# Patient Record
Sex: FEMALE | Race: WHITE | NOT HISPANIC OR LATINO | Employment: PART TIME | URBAN - METROPOLITAN AREA
[De-identification: names, ages, dates, MRNs, and addresses within clinical notes are randomized per-mention and may not be internally consistent; named-entity substitution may affect disease eponyms.]

---

## 2021-07-26 PROCEDURE — 88305 TISSUE EXAM BY PATHOLOGIST: CPT | Mod: 26 | Performed by: DERMATOLOGY

## 2021-07-26 PROCEDURE — 88305 TISSUE EXAM BY PATHOLOGIST: CPT | Mod: TC,ORL | Performed by: DERMATOLOGY

## 2021-07-28 ENCOUNTER — LAB REQUISITION (OUTPATIENT)
Dept: LAB | Facility: CLINIC | Age: 71
End: 2021-07-28
Payer: COMMERCIAL

## 2021-07-29 LAB
PATH REPORT.COMMENTS IMP SPEC: NORMAL
PATH REPORT.COMMENTS IMP SPEC: NORMAL
PATH REPORT.FINAL DX SPEC: NORMAL
PATH REPORT.GROSS SPEC: NORMAL
PATH REPORT.MICROSCOPIC SPEC OTHER STN: NORMAL
PATH REPORT.RELEVANT HX SPEC: NORMAL

## 2024-04-23 ENCOUNTER — TRANSFERRED RECORDS (OUTPATIENT)
Dept: HEALTH INFORMATION MANAGEMENT | Facility: CLINIC | Age: 74
End: 2024-04-23
Payer: COMMERCIAL

## 2024-04-26 ENCOUNTER — TRANSCRIBE ORDERS (OUTPATIENT)
Dept: OTHER | Age: 74
End: 2024-04-26

## 2024-04-26 DIAGNOSIS — Z17.0 MALIGNANT NEOPLASM OF UPPER-OUTER QUADRANT OF LEFT BREAST IN FEMALE, ESTROGEN RECEPTOR POSITIVE (H): Primary | ICD-10-CM

## 2024-04-26 DIAGNOSIS — C50.412 MALIGNANT NEOPLASM OF UPPER-OUTER QUADRANT OF LEFT BREAST IN FEMALE, ESTROGEN RECEPTOR POSITIVE (H): Primary | ICD-10-CM

## 2024-04-29 ENCOUNTER — PRE VISIT (OUTPATIENT)
Dept: ONCOLOGY | Facility: CLINIC | Age: 74
End: 2024-04-29
Payer: COMMERCIAL

## 2024-04-29 ENCOUNTER — PATIENT OUTREACH (OUTPATIENT)
Dept: ONCOLOGY | Facility: CLINIC | Age: 74
End: 2024-04-29
Payer: COMMERCIAL

## 2024-04-29 NOTE — TELEPHONE ENCOUNTER
RECORDS STATUS - BREAST    RECORDS REQUESTED FROM: [facility]    Appt Date: TBD NN WQ    Malignant neoplasm of upper-outer quadrant of left breast in female, estrogen receptor positive (H)     Records Requested     April 29, 2024 11:03 AM  [id number]   Facility  [facility]    Outcome I called [facility] Dept [phone] - I left a detailed vm requesting a call back.      Imaging Received  April 29, 2024 3:10 PM    Action: Breast Images from [facility] received and email sent to  Imaging team to resolve breast images to PACS.     Action May 1, 2024 9:01 AM SY   Action Taken - Carlie at [facility] path called stating they just receive the FISH report and ask if we would like a copy. I ask for Carlie to fax it over.     1:01 PM:  - FISH Path report is received, faxed to HIM and emailed to NN.      Action May 1, 2024 12:38 PM    Action Taken Slides from [facility] received and taken to 5th floor path lab for review.     Action May 15, 2024 1:59 PM    Action Taken Slides from [facility] (HER2 slides) received and taken to [facility] floor [facility] lab for review.        NOTES DETAILS STATUS   OFFICE NOTE from referring provider [facility]- [facility] Asmita Randolph MD    [facility]   MEDICATION LIST [facility]- [facility]    LABS     PATHOLOGY REPORTS  (Tissue diagnosis, Stage, ER/MD percentage positive and intensity of staining, HER2 IHC, FISH, and all biopsies from breast and any distant metastasis)                 Report in [facility]- [facility]  (Slides Requested)  [facility] [employer] Tracking #: [id number] 4/23/24: [id number] (positive)   IMAGING (NEED IMAGES & REPORT)     MAMMO Requested- [facility]  4/23/2024, 4/15/2024, 3/21/2023, 3/21/2022, 3/5/2021, 2/24/2020, 2/4/2019    ULTRASOUND Requested- [facility] 4/23/2024    Xray Chest Requested- [facility]

## 2024-04-29 NOTE — PROGRESS NOTES
New Patient Oncology Nurse Navigator Note     Referring provider: Asmita Randolph MD     Referring Clinic/Organization: [facility]      Referred to (specialty:) Medical Oncology and Cancer Surgery      Date Referral Received: April 29, 2024     Evaluation for:  C50.412, Z17.0 (ICD-10-CM) - Malignant neoplasm of upper-outer quadrant of left breast in female, estrogen receptor positive (H)     Clinical History (per Nurse review of records provided):      Patient had bilateral screening mammograms on 4/15/24 and a focal asymmetry was identified in the outer left breast at approximately 3:00, 12 to 14 cm from the nipple. Left breast ultrasound followed on 4/23 and the mammographic abnormality corresponds with irregular, hyperechoic, shadowing mass at the 2:00 position 11 cm from the nipple. The mass measures 17 x 16 x 15 mm.     4/23/24 - [id number]  Left breast mass, 17 x 16 x 15 mm mass, 2 o  clock, 11 cm from the nipple, coil clip, ultrasound-guided core biopsy:  --- POSITIVE FOR MALIGNANCY: Invasive ductal carcinoma, characterized by:  --------- A. [place] stgstrstastdstest:st st1st of 3, Jose Alfredo score: 7 of 9.  --------- B. Greatest linear length of invasive carcinoma: 7 mm.  --- Associated ductal carcinoma in situ: Absent.  ------ Ancillary studies:  --------- A. Estrogen receptor status: Positive (Strong nuclear staining in >95% of tumor cells).  --------- B. Progesterone receptor status: Positive (Weak nuclear staining in 30% of tumor cells).  --------- C. HER2 status: Pending, results as an addendum.  --------- D. Ki-67 proliferation index: 22.4% (112 of 500).  --- Her2 by FISH:  NEGATIVE (Unamplified).  ----- Her2:D17Z1 ratio = 1.23.  ----- Average Her2 signals per cell = 3.3.    4/29 3390 - Telephoned and spoke with Roxana to assist in scheduling medical oncology and cancer surgery consults. She is anxious to proceed and would like in-person consults. She has a daughter who lives in [facility] [place] [facility]  [place], and the travel and accommodations are not an issue at this point. We discussed consults this week and she is willing to see Dr. Fairbanks, a [facility] provider, at  if necessary.     4/29 1451 - HER2 by FISH has not reported. We will proceed with cancer surgery consult on 5/1 with Dr. Fairbanks at , and medical oncology with Dr. Barfield on 5/6 at .    Patient resides in [place], [place].    Records Location: Care Everywhere, Media, and See Bookmarked material     Records Needed:   Breast imaging for past 5 years (nothing on PACS as of 4/29)   Pathology review for 4/23/24 - [id number]  HER2 by FISH report for 4/23/24 - [id number] (when available)

## 2024-04-30 ENCOUNTER — TRANSCRIBE ORDERS (OUTPATIENT)
Dept: ONCOLOGY | Facility: CLINIC | Age: 74
End: 2024-04-30
Payer: COMMERCIAL

## 2024-04-30 ENCOUNTER — TELEPHONE (OUTPATIENT)
Dept: SURGERY | Facility: CLINIC | Age: 74
End: 2024-04-30
Payer: COMMERCIAL

## 2024-04-30 DIAGNOSIS — C50.412 MALIGNANT NEOPLASM OF UPPER-OUTER QUADRANT OF LEFT BREAST IN FEMALE, ESTROGEN RECEPTOR POSITIVE (H): Primary | ICD-10-CM

## 2024-04-30 DIAGNOSIS — Z17.0 MALIGNANT NEOPLASM OF UPPER-OUTER QUADRANT OF LEFT BREAST IN FEMALE, ESTROGEN RECEPTOR POSITIVE (H): Primary | ICD-10-CM

## 2024-04-30 NOTE — TELEPHONE ENCOUNTER
Patient confirmed via My Chart. My Chart message sent with time and location.    PREVISIT INFORMATION                                                    Roxana Card scheduled for future visit at [facility] specialty clinics.    Patient is scheduled to see Dr. Fairbanks on 5/1/24  Reason for visit: Breast Cancer  Referring provider Radiology  Has patient seen previous specialist? No  Medical Records:  Available in chart.  Patient was previously seen at a [facility] or [facility] facility.    REVIEW                                                      New patient packet mailed to patient: No  Medication reconciliation complete: No      No current outpatient medications on file.       Allergies: Patient has no allergy information on record.        PLAN/FOLLOW-UP NEEDED                                                      Previsit review complete.  Patient will see provider at future scheduled appointment.     Patient Reminders Given:  Please, make sure you bring an updated list of your medications.   If you are having a procedure, please, present 15 minutes early.  If you need to cancel or reschedule,please call [phone].    Olivia Marcelino LPN

## 2024-05-01 ENCOUNTER — OFFICE VISIT (OUTPATIENT)
Dept: SURGERY | Facility: CLINIC | Age: 74
End: 2024-05-01
Attending: FAMILY MEDICINE
Payer: COMMERCIAL

## 2024-05-01 VITALS — HEIGHT: 61 IN | BODY MASS INDEX: 32.81 KG/M2 | WEIGHT: 173.8 LBS

## 2024-05-01 DIAGNOSIS — C50.412 MALIGNANT NEOPLASM OF UPPER-OUTER QUADRANT OF LEFT BREAST IN FEMALE, ESTROGEN RECEPTOR POSITIVE (H): Primary | ICD-10-CM

## 2024-05-01 DIAGNOSIS — Z17.0 MALIGNANT NEOPLASM OF UPPER-OUTER QUADRANT OF LEFT BREAST IN FEMALE, ESTROGEN RECEPTOR POSITIVE (H): Primary | ICD-10-CM

## 2024-05-01 PROCEDURE — 99205 OFFICE O/P NEW HI 60 MIN: CPT | Performed by: SURGERY

## 2024-05-01 RX ORDER — CLINDAMYCIN HCL 150 MG
CAPSULE ORAL
COMMUNITY
Start: 2023-12-12

## 2024-05-01 RX ORDER — DILTIAZEM HYDROCHLORIDE 60 MG/1
60 CAPSULE, EXTENDED RELEASE ORAL 2 TIMES DAILY
COMMUNITY
Start: 2024-04-15 | End: 2025-04-15

## 2024-05-01 RX ORDER — CALCIUM POLYCARBOPHIL 625 MG
1250 TABLET ORAL DAILY
COMMUNITY

## 2024-05-01 RX ORDER — ALBUTEROL SULFATE 90 UG/1
2 AEROSOL, METERED RESPIRATORY (INHALATION)
COMMUNITY
Start: 2023-03-20 | End: 2024-09-09

## 2024-05-01 RX ORDER — CETIRIZINE HYDROCHLORIDE 10 MG/1
10 TABLET ORAL DAILY
COMMUNITY
Start: 2023-06-01

## 2024-05-01 RX ORDER — ACETAMINOPHEN 325 MG/1
975 TABLET ORAL ONCE
Status: CANCELLED | OUTPATIENT
Start: 2024-05-01 | End: 2024-05-01

## 2024-05-01 RX ORDER — PANTOPRAZOLE SODIUM 40 MG/1
TABLET, DELAYED RELEASE ORAL
COMMUNITY
Start: 2022-10-07

## 2024-05-01 RX ORDER — AZELASTINE 1 MG/ML
2 SPRAY, METERED NASAL
COMMUNITY
End: 2024-09-09

## 2024-05-01 RX ORDER — NITROGLYCERIN 0.4 MG/1
0.4 TABLET SUBLINGUAL
COMMUNITY
Start: 2023-03-20

## 2024-05-01 RX ORDER — TRIAMCINOLONE ACETONIDE 1 MG/G
CREAM TOPICAL
COMMUNITY

## 2024-05-01 RX ORDER — ASPIRIN 81 MG
TABLET,CHEWABLE ORAL
COMMUNITY
Start: 2023-10-13

## 2024-05-01 NOTE — NURSING NOTE
Breast Nurse Care Coordination:       I introduced self to patient and  and explained my role of breast nurse coordinator. I visited with Roxana at her surgical consultation on 5/1/24  with Dr. Fairbanks at the [facility]         Roxana was given the new breast cancer patient packet which includes educational material and support resources such as American [employer], Fighting Cancer through Diet and Lifestyle, [employer], [employer], and [employer]. I also enclosed a copy of her right breast pathology report dated 4/25/24      At the end of the consultation, we reviewed Roxana's plan of care and education. The plan is for patient to be scheduled for a contrast Mammogram. This is scheduled on 5/6/24 at the [facility] at 11[place]. Roxana is leaning towards a Lumpectomy. Pt informed per Dr. Fairbanks surgery orders will be placed today and pt will be contacted to schedule surgery as soon as possible.      I informed her for surgery she will need a pre-op exam within 30 days before surgery. Patient notified that surgery pathology results will be reviewed in clinic at Roxana's first post operative visit with Dr. Fairbanks about two weeks after surgery. Surgery packet reviewed with patient and surgery soap provided      I gave patient [facility] educational materials regarding Lumpectomy, Mastectomy  and [place] Lymph Node biopsy.  Informed patient for Breast surgery, she will want to have a comfortable supportive bra that opens in the front to wear 24/7- 2 weeks following her surgery. I gave patient information on different options to purchase post surgery bras and garments     I answered her questions and encouraged patient to call me back with any future questions or concerns.  Roxana has my contact information, and knows to contact me in the future with any questions or concerns.     Ivy Ibanez CC  Surgical Oncology, Plastics and General Surgery  [facility]  Clinic

## 2024-05-01 NOTE — NURSING NOTE
"Roxana Card's goals for this visit include:   Chief Complaint   Patient presents with    Consult     Breast cancer       She requests these members of her care team be copied on today's visit information: no    PCP: Asmita Randolph    Referring Provider:  Asmita Rogers MD  [address] [place] [phone]    Ht 1.537 m (5' 0.5\")   Wt 78.8 kg (173 lb 12.8 oz)   BMI 33.38 kg/m      Do you need any medication refills at today's visit? No    Olivia Marcelino LPN      "

## 2024-05-01 NOTE — PROGRESS NOTES
NEW SURGICAL CONSULTATION  May 1, 2024    Roxana Card is a 73 year old woman who presents with a left  breast complaint.  She was referred by Asmita Rogers MD.    HPI:    She notes no masses in either breast, axilla, or neck. She denies any nipple discharge or nipple inversion.     Imaging showed a 1.7 cm mass at 2:00 11 cm FN in the LEFT breast.    A biopsy was performed and a coil clip was placed.  It showed invasive ductal carcinoma, grade 2, ER+ OH+ HER2-.      BREAST-SPECIFIC HISTORY:  Prior breast surgeries: Yes left lumpectomy 20 years ago  Prior radiation history: No  Bra size: 38 G  Dominant hand: Right    FAMILY HISTORY:  Breast ca: No  Ovarian ca: No  Pancreatic ca: No  Melanoma: No  Gastric ca: No  Colon ca: Yes - father (dx 71), pat uncle and aunt  Other cancer: Yes pat uncle w/ lung ca (smoker)    Patient Active Problem List   Diagnosis    Malignant neoplasm of upper-outer quadrant of left breast in female, estrogen receptor positive (H)    PCI in 2017 - ASA only now  Able to climb a flight of stairs  No CVA, DM    No past medical history on file.    No past surgical history on file.  Bilateral knee replacement    Current Outpatient Medications   Medication Sig Dispense Refill    albuterol (PROAIR HFA/PROVENTIL HFA/VENTOLIN HFA) 108 (90 Base) MCG/ACT inhaler Inhale 2 puffs into the lungs      ASPIRIN LOW DOSE 81 MG chewable tablet CHEW AND SWALLOW 1 TAB IN THE MORNING AND 1 TAB IN THE EVENING FOR BLOOD CLOT PREVENTION.      azelastine (ASTELIN) 0.1 % nasal spray Spray 2 sprays in nostril      Calcium Polycarbophil (FIBER) 625 MG tablet Take 1,250 mg by mouth      cetirizine (ZYRTEC) 10 MG tablet Take 10 mg by mouth      clindamycin (CLEOCIN) 150 MG capsule Take 4 Capsules 1 hour prior to dental appointment.      diltiazem ER (CARDIZEM SR) 60 MG 12 hr capsule Take 60 mg by mouth      Multiple Vitamin (MULTI-DAY VITAMINS PO) Take 1 tablet by mouth daily      nitroGLYcerin (NITROSTAT) 0.4  "MG sublingual tablet Place 0.4 mg under the tongue      pantoprazole (PROTONIX) 40 MG EC tablet TAKE ONE TABLET BY MOUTH EVERY MORNING 30 MINUTES BEFORE BREAKFAST      triamcinolone (KENALOG) 0.1 % external cream Apply to the affected area(s) if needed in the morning, at noon, and before bedtime. 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 Has a daughter who lives in [facility].    ROS:  Back pain: No  Headache: No  Abdominal pain: No  Unexpected weight loss: No  Easy bruising/bleeding: No  History of DVT/PE: No    Ht 1.537 m (5' 0.5\")   Wt 78.8 kg (173 lb 12.8 oz)   BMI 33.38 kg/m     Physical Exam  Constitutional:       Appearance: She is well-developed.   Chest:   Breasts:     Breasts are symmetrical.      Right: No inverted nipple, mass, nipple discharge, skin change or tenderness.      Left: Mass present. No inverted nipple, nipple discharge, skin change or tenderness.          Comments: Patient was examined in both supine and upright positions.   Lymphadenopathy:      Cervical: No cervical adenopathy.      Right cervical: No superficial, deep or posterior cervical adenopathy.     Left cervical: No superficial, deep or posterior cervical adenopathy.      Upper Body:      Right upper body: No supraclavicular, axillary or pectoral adenopathy.      Left upper body: No supraclavicular, axillary or " pectoral adenopathy.      Comments: No lymphedema in bilateral upper extremities.   Skin:     General: Skin is warm and dry.          INVESTIGATIONS:    Ultrasound at [facility] (4/23/2024) showed:  FINDINGS: Ultrasound evaluation of the left breast was performed. The mammographic abnormality corresponds with irregular, hyperechoic,   shadowing mass at the 2:00 position 11 cm from the nipple. The mass measures 17 x 16 x 15 mm. No additional suspicious sonographic   findings.   IMPRESSION: Highly suspicious left breast mass.     Screening Mammogram at [facility] (4/15/2024) showed:  FINDINGS: Tomosynthesis craniocaudal and mediolateral oblique views were obtained. There is a focal asymmetry in the outer left breast at   approximately 3:00, 12 to 14 cm from the nipple. Recommend further evaluation with ultrasound and possible diagnostic mammogram.   The other breast is unremarkable.   IMPRESSION: Incomplete imaging assessment.     Biopsy (4/23/2024) showed:  Final Diagnosis    Left breast mass, 17 x 16 x 15 mm mass, 2 o  clock, 11 cm from the nipple, coil clip, ultrasound-guided core biopsy:  --- POSITIVE FOR MALIGNANCY:  Invasive ductal carcinoma, characterized by:  --------- A. [place] stgstrstastdstest:st st1st of 3, Jose Alfredo score: 7 of 9.  --------- B. Greatest linear length of invasive carcinoma: 7 mm.  --- Associated ductal carcinoma in situ: Absent.     ------ Ancillary studies:  --------- A. Estrogen receptor status: Positive (Strong nuclear staining in >95% of tumor cells).  --------- B. Progesterone receptor status: Positive (Weak nuclear staining in 30% of tumor cells).  --------- C. Her2 status: Pending, results as an addendum.  --------- D. Ki-67 proliferation index: 22.4% (112 of 500).     ASSESSMENT:  Roxana Card is a 73 year old woman with a LEFT breast cancer.    Her stage is:   Cancer Staging   Malignant neoplasm of upper-outer quadrant of left breast in female, estrogen receptor positive (H)  Staging form:  "Breast, AJCC 8th Edition  - Clinical: Stage IA (cT1c, cN0, cM0, G2, ER+, MN+, HER2-) - Signed by America Fairbanks MD on 5/1/2024    I personally reviewed the imaging above. I do recommend a BILATERAL contrast mammogram to complete her breast evaluation.    I reviewed the imaging, diagnosis, staging, and management (including surgery, chemotherapy/systemic therapy, radiation therapy, and endocrine therapy) of breast cancer with Roxana Card and her . A copy of the biopsy pathology report was also provided.    The mainstay of treatment for resectable breast cancer is surgical resection, in the form of either breast conservation (segmental mastectomy plus radiation) or mastectomy.  We reviewed that the two strategies are equivalent in terms of overall survival.  The advantages and disadvantages of each were discussed.    The surgeries are performed as day surgeries.  Roxana Card IS a candidate for breast conservation therapy.  We discussed that this involves two necessary components: the lumpectomy (or \"segmental mastectomy\"), and several weeks of whole breast radiation therapy.  We discussed that the overall survival after breast conservation therapy is identical to mastectomy and that local recurrence rates are significantly higher if segmental mastectomy was performed without subsequent radiation. However, newer data suggest that in women >70, radiation may be omitted or delivered in a shorter course in breast conservation therapy in the setting of small, favourable primaries in the absence of lymphovascular invasion, janie metastases, or concerning surgical margins.  We reviewed that this determination will be made based on post surgical pathology.   We also discussed the significance of clear margins and that a subsequent procedure may be necessary to achieve this.    Because the lesion is not palpable, a radiofrequency identification (RFID) seed-localized approach would be taken for breast " conservation.  She would present prior to surgery for an image-guided RFID seed placement.  The risks of a RFID seed-localized segmental mastectomy were discussed with the patient and family, including the risks of bleeding, wound infection, wound dehiscence, and post-operative contour change to the breast.  We discussed obtaining a supportive bra for postoperative recovery and that prescriptions for narcotics are not typically provided for this procedure.    There is no advantage to a mastectomy in this situation.    In addition to the surgical management of the breast, a sentinel lymph node biopsy is recommended for janie staging of the axilla.  This is performed with the combination of the radioactive Tilmanocept and dye (lymphazurin or indocyanine green). The risks of a sentinel lymph node biopsy were discussed with the patient and family, including the risks of lymphedema (5%), bleeding, wound infection, wound dehiscence, seroma formation, and paresthesias. There is an approximately 10% false negative rate associated with sentinel lymph node biopsy as published in the literature.  The findings of the sentinel lymph node biopsy may result in the need for further treatment.     We discussed that surgical pathology results will be reviewed at the postoperative visit to allow for careful discussion of next steps and for answering questions.    Finally, we reviewed that as part of team-based approach to breast cancer, medical oncology and radiation oncology referrals may also be made to discuss systemic/endocrine therapy and radiation therapy. She has an upcoming appointment with Dr Barfield of Medical oncology as well.    All of the above was discussed with Roxana Card and all questions were answered.  She elected to proceed with contrast mammogram and then LEFT RFID seed-localized segmental mastectomy and axillary sentinel lymph node mapping and biopsy.    Total time spent with the patient was 50 minutes, of  which 75% was counseling.     PLAN:  BILATERAL contrast mammogram  Consultation with Dr Barfield of Medical Oncology on 5/6  Proceed with breast conservation first if no other abnormalities are seen on contrast mammogram  LEFT RFID seed-localized segmental mastectomy   LEFT axillary sentinel lymph node mapping and biopsy   Patient to report to her PCP for preoperative H&P and testing.     America Fairbanks MD MS [facility] FACS  Associate Professor of Surgery  Division of Surgical Oncology  [facility]     60 minutes spent on the date of the encounter doing chart review, review of outside records, review of test result(s), interpretation of test(s), patient visit, documentation, care coordination, and discussion with family.

## 2024-05-02 ENCOUNTER — LAB REQUISITION (OUTPATIENT)
Dept: LAB | Facility: CLINIC | Age: 74
End: 2024-05-02
Payer: COMMERCIAL

## 2024-05-02 ENCOUNTER — TELEPHONE (OUTPATIENT)
Dept: ONCOLOGY | Facility: CLINIC | Age: 74
End: 2024-05-02
Payer: COMMERCIAL

## 2024-05-02 PROCEDURE — 88321 CONSLTJ&REPRT SLD PREP ELSWR: CPT | Mod: GC | Performed by: STUDENT IN AN ORGANIZED HEALTH CARE EDUCATION/TRAINING PROGRAM

## 2024-05-02 NOTE — TELEPHONE ENCOUNTER
Called patient to discuss surgery scheduling with Dr. Fairbanks. Spoke with patient and scheduled surgery with Dr. Fairbanks. Surgery is scheduled at [facility] for 5/15.    H&P with PCP Dr. Miriam Rogers within 30 days of the surgery date. Patient verbalized understanding H&P is needed prior to surgery and instructed to call PCP office to schedule. Patient is aware they will get a call from the pre-op nurses prior to surgery to confirm their arrival time.      Breast Center to inject NM the morning of surgery. Patient also will need tag placement prior to surgery. Message provided to breast RN in .    Post-op scheduled for 5/29 with Dr. Fairbanks in [facility].    Surgery packet provided in clinic during clinic visit on 5/1.    The patient has no further questions regarding surgery at this time.     Lorri Lujan on 5/2/2024 at 10:51 AM

## 2024-05-03 NOTE — PROGRESS NOTES
Medical Oncology Note      Roxana Card    Age: 73 year old        CC: Breast Cancer      HPI: Roxana Card is a 73 year old postmenopausal woman with recent diagnosis of screen detected cT1cN0 HR+/HER2 negative IDC of the left breast. She presents today for med onc initial consultation.         Her full oncologic history is as follows:   Oncologic History  Patient Active Problem List    Diagnosis Date Noted    Malignant neoplasm of upper-outer quadrant of left breast in female, estrogen receptor positive (H) 05/01/2024     Priority: High     Left breast cancer  3/20/2023 last normal screening mammo    4/15/2024 screening mammo showing focal asymmetry in the outer left breast at 3:00, 12-14 cm FN    4/23/2024 left breast ultrasound showing a 1.7 x 1.6 x 1.5 cm mass at 2:00, 11 cm FN, corresponding to the mammographic abnormality.  US guided biopsy demonstrates a grade 2 IDC.  ER (3+,> 95%), GA (1+, 30%), HER2 2+ negative by FISH, Ki-67 22%    5/6/2024 Contrast enhanced mammo  left breast demonstrates 1.8 cm of enhancement. No other enhancements     5/6/2024 US of left axillary LN normal       Coronary artery disease involving native coronary artery of native heart without angina pectoris 05/06/2024     Priority: Medium    GERD (gastroesophageal reflux disease) 05/06/2024     Priority: Medium    Mixed hyperlipidemia 05/06/2024     Priority: Medium    Primary hypertension 05/06/2024     Priority: Medium    Vitamin D deficiency 05/06/2024     Priority: Medium    Recurrent UTI 09/20/2023     Priority: Medium    Stage 3a chronic kidney disease (H) 03/17/2022     Priority: Medium    Other microscopic colitis 11/23/2021     Priority: Medium    Bilateral primary osteoarthritis of knee 04/28/2021     Priority: Medium    Age-related osteoporosis without current pathological fracture 04/20/2021     Priority: Medium    Mild intermittent asthma 07/09/2019     Priority: Medium          Interval History  Roxana roth  accompanied by her supportive   for initial consultation.  She met with Dr. Fairbanks last week and is planned to undergo partial mastectomy and SLNB on 5/15/2024.    She denies any symptoms related to her left breast cancer including a palpable mass, pain, nipple discharge, or erythema of the skin.    She has otherwise been feeling well without any headaches, visual changes, cough, SOB, chest pain, n/v, constipation/diarrhea, abdominal pain, focal weakness or numbness.     They recently returned from a vacation in [place].      Past Medical History  Past Medical History:   Diagnosis Date    Age-related osteoporosis without current pathological fracture 2021    Breast cancer (H)     CAD (coronary artery disease)     GERD (gastroesophageal reflux disease) 2024    Microscopic colitis     resolved    Mild intermittent asthma 2019    Osteoarthritis          Past Surgical History  Past Surgical History:   Procedure Laterality Date    CV PCI  2016    HYSTERECTOMY, MEHRDAD      for prolapse    knee replacement Bilateral 10/23/2023         Family History  Family History   Problem Relation Age of Onset    Colon Cancer Father     Colon Cancer Paternal Aunt     Colon Cancer Paternal Uncle     Lung Cancer Paternal Uncle     Colon Cancer Cousin     Colon Cancer Other         8 out of 15 paternal aunt/uncles have colon cancer          Social History  Social History     Tobacco Use    Smoking status: Never    Smokeless tobacco: Never      Social History     Social History Narrative    Menarche 12    Menopause 55. HRT for 5 years      first at age 20.  both children     Youngest child at diagnosis    Retired from, but working part time in  home          Current Medications:  Current Outpatient Medications   Medication Sig Dispense Refill    acetaminophen (TYLENOL) 650 MG CR tablet Take 1,300 mg by mouth      albuterol (PROAIR HFA/PROVENTIL HFA/VENTOLIN HFA) 108 (90 Base) MCG/ACT inhaler  "Inhale 2 puffs into the lungs      ASPIRIN LOW DOSE 81 MG chewable tablet CHEW AND SWALLOW 1 TAB IN THE MORNING AND 1 TAB IN THE EVENING FOR BLOOD CLOT PREVENTION.      azelastine (ASTELIN) 0.1 % nasal spray Spray 2 sprays in nostril      Calcium Polycarbophil (FIBER) 625 MG tablet Take 1,250 mg by mouth      cetirizine (ZYRTEC) 10 MG tablet Take 10 mg by mouth      clindamycin (CLEOCIN) 150 MG capsule Take 4 Capsules 1 hour prior to dental appointment.      diltiazem ER (CARDIZEM SR) 60 MG 12 hr capsule Take 60 mg by mouth      Multiple Vitamin (MULTI-DAY VITAMINS PO) Take 1 tablet by mouth daily      pantoprazole (PROTONIX) 40 MG EC tablet TAKE ONE TABLET BY MOUTH EVERY MORNING 30 MINUTES BEFORE BREAKFAST      REPATHA SURECLICK 140 MG/ML prefilled autoinjector Inject 140 mg Subcutaneous      triamcinolone (KENALOG) 0.1 % external cream Apply to the affected area(s) if needed in the morning, at noon, and before bedtime. External cream; APPLY A SMALL AMOUNT 3 TIMES DAILY AS DIRECTED      vitamin D3 (CHOLECALCIFEROL) 125 MCG (5000 UT) tablet Take 5,000 Units by mouth      nitroGLYcerin (NITROSTAT) 0.4 MG sublingual tablet Place 0.4 mg under the tongue (Patient not taking: Reported on 5/6/2024)           ECOG Performance Status: 0    Physical Examination:  /81 (BP Location: Right arm, Patient Position: Sitting, Cuff Size: Adult Large)   Pulse 87   Temp 97.7  F (36.5  C) (Oral)   Resp 16   Ht 1.522 m (4' 11.92\")   Wt 77.1 kg (170 lb)   SpO2 98%   BMI 33.29 kg/m    General:  Well appearing, well-nourished adult female in NAD.  HEENT:  Normocephalic.  Sclera anicteric.  MMM.  No lesions of the oropharynx.  Breast: Left breast 2-3:00 1cm density w/ indistict borders   Lymph:  No cervical, supraclavicular, or axillary LAD.  Chest:  CTA bilaterally.  No wheezes or crackles.  CV:  RRR.  No murmurs or gallops  Abd:  Soft/NT/ND.  BS normoactive.  No hepatosplenomegaly.  Ext:  No pitting edema of the bilateral " "lower extremities.  Pulses 2+ and symmetric.  Musculo:  Strength 5/5 throughout.  Neuro:  Cranial nerves grossly intact.  Psych:  Mood and affect appear normal.    Laboratory Data:  No results found for: \"WBC\", \"HGB\", \"HCT\", \"MCV\", \"PLT\"  No results found for: \"NA\", \"HCO3\", \"CREATININE\", \"BUN\", \"ANIONGAP\", \"GLUCOSE\"  No results found for: \"ALT\", \"AST\", \"GGT\", \"ALKPHOS\", \"BILITOT\"  No results found for: \"INR\", \"PT\"      Radiology data:  I have personally reviewed the images of / or the following radiology data: As detailed in the oncology timeline      Pathology and other data:  I have personally reviewed the following data: As detailed in the oncology timeline      Assessment and Recommendations  Roxana Card  is a 73 year old postmenopausal  woman with a new diagnosis of screen detected cT1cN0 HR+/HER2 negative IDC of the left breast.    I had a long discussion with the patient in which we reviewed her breast cancer diagnosis and workup history to date. I reviewed all of the relevant and available clinic notes, imaging, and pathology reports. We discussed the use of locoregional therapy to reduce risk of locoregional disease and the use of systemic therapies to primarily reduce risk of systemic recurrence. Informed her that locoregional breast cancer is curable and that the goal would be to reduced her risk of developing systemic disease, which can potentially be incurable. Based on the currently available information thus far, she has localized disease.    Because she has HR+ disease, she will need at least 5 years of hormone therapy. Since she is postmenopausal, I recommend aromatase inhibitors (AI). We did not discuss adverse effects today, but will do so at her follow-up appointment with me.  With regards to systemic chemotherapy, discussed that it is unlikely that she will need adjuvant chemotherapy.  However in light of slightly elevated proliferation rate (Ki-67) as seen on her diagnostic biopsy and " lower PA positivity, there is a chance she could have a luminal B disease, which can be molecularly high risk.  In that case, discussed that we can have further discussions about the potential benefit of adjuvant systemic chemotherapy.  Lastly, we briefly discussed adjuvant targeted therapy such as CDK 4/6 inhibitors.  Based on currently available information, I do not think that she will need this -although we will finalize decision after her final surgical pathology.    Finally, in light of her breast cancer diagnosis and extensive family history of colon cancer, I recommend that she undergo genetic testing.  This may at times guide our treatment decisions and be helpful information for her family members to have.  She is agreeable to undergoing this additional testing.      RECOMMENDATIONS  - Proceed with surgery first -will need referral to radiation therapy after surgery  - Will request Oncotype Dx  - She will need adjuvant endocrine therapy  - Germline genetic testing and referral to genetic counseling        75 minutes spent on the date of the encounter doing chart review, review of test results, interpretation of tests, patient visit, and documentation       Dame Carolyne MD   of Medicine  Division of Hematology, Oncology and Transplantation  [facility]

## 2024-05-06 ENCOUNTER — ONCOLOGY VISIT (OUTPATIENT)
Dept: ONCOLOGY | Facility: CLINIC | Age: 74
End: 2024-05-06
Attending: INTERNAL MEDICINE
Payer: COMMERCIAL

## 2024-05-06 ENCOUNTER — ANCILLARY PROCEDURE (OUTPATIENT)
Dept: MAMMOGRAPHY | Facility: CLINIC | Age: 74
End: 2024-05-06
Attending: SURGERY
Payer: COMMERCIAL

## 2024-05-06 ENCOUNTER — PATIENT OUTREACH (OUTPATIENT)
Dept: ONCOLOGY | Facility: CLINIC | Age: 74
End: 2024-05-06

## 2024-05-06 VITALS
HEIGHT: 60 IN | BODY MASS INDEX: 33.38 KG/M2 | SYSTOLIC BLOOD PRESSURE: 123 MMHG | WEIGHT: 170 LBS | RESPIRATION RATE: 16 BRPM | HEART RATE: 87 BPM | TEMPERATURE: 97.7 F | OXYGEN SATURATION: 98 % | DIASTOLIC BLOOD PRESSURE: 81 MMHG

## 2024-05-06 DIAGNOSIS — C77.3 SECONDARY AND UNSPECIFIED MALIGNANT NEOPLASM OF AXILLA AND UPPER LIMB LYMPH NODES (H): ICD-10-CM

## 2024-05-06 DIAGNOSIS — Z17.0 MALIGNANT NEOPLASM OF UPPER-OUTER QUADRANT OF LEFT BREAST IN FEMALE, ESTROGEN RECEPTOR POSITIVE (H): ICD-10-CM

## 2024-05-06 DIAGNOSIS — C50.412 MALIGNANT NEOPLASM OF UPPER-OUTER QUADRANT OF LEFT FEMALE BREAST (H): ICD-10-CM

## 2024-05-06 DIAGNOSIS — C50.412 MALIGNANT NEOPLASM OF UPPER-OUTER QUADRANT OF LEFT BREAST IN FEMALE, ESTROGEN RECEPTOR POSITIVE (H): ICD-10-CM

## 2024-05-06 PROBLEM — I25.10 CORONARY ARTERY DISEASE INVOLVING NATIVE CORONARY ARTERY OF NATIVE HEART WITHOUT ANGINA PECTORIS: Status: ACTIVE | Noted: 2024-05-06

## 2024-05-06 PROBLEM — E78.2 MIXED HYPERLIPIDEMIA: Status: ACTIVE | Noted: 2024-05-06

## 2024-05-06 PROBLEM — E55.9 VITAMIN D DEFICIENCY: Status: ACTIVE | Noted: 2024-05-06

## 2024-05-06 PROBLEM — N39.0 RECURRENT UTI: Status: ACTIVE | Noted: 2023-09-20

## 2024-05-06 PROBLEM — J45.20 MILD INTERMITTENT ASTHMA: Status: ACTIVE | Noted: 2019-07-09

## 2024-05-06 PROBLEM — K52.838 OTHER MICROSCOPIC COLITIS: Status: ACTIVE | Noted: 2021-11-23

## 2024-05-06 PROBLEM — M81.0 AGE-RELATED OSTEOPOROSIS WITHOUT CURRENT PATHOLOGICAL FRACTURE: Status: ACTIVE | Noted: 2021-04-20

## 2024-05-06 PROBLEM — I10 PRIMARY HYPERTENSION: Status: ACTIVE | Noted: 2024-05-06

## 2024-05-06 PROBLEM — M17.0 BILATERAL PRIMARY OSTEOARTHRITIS OF KNEE: Status: ACTIVE | Noted: 2021-04-28

## 2024-05-06 PROBLEM — K21.9 GERD (GASTROESOPHAGEAL REFLUX DISEASE): Status: ACTIVE | Noted: 2024-05-06

## 2024-05-06 PROBLEM — N18.31 STAGE 3A CHRONIC KIDNEY DISEASE (H): Status: ACTIVE | Noted: 2022-03-17

## 2024-05-06 PROCEDURE — 77066 DX MAMMO INCL CAD BI: CPT | Performed by: STUDENT IN AN ORGANIZED HEALTH CARE EDUCATION/TRAINING PROGRAM

## 2024-05-06 PROCEDURE — G0463 HOSPITAL OUTPT CLINIC VISIT: HCPCS | Performed by: INTERNAL MEDICINE

## 2024-05-06 PROCEDURE — 76882 US LMTD JT/FCL EVL NVASC XTR: CPT | Mod: LT | Performed by: STUDENT IN AN ORGANIZED HEALTH CARE EDUCATION/TRAINING PROGRAM

## 2024-05-06 PROCEDURE — G0279 TOMOSYNTHESIS, MAMMO: HCPCS | Performed by: STUDENT IN AN ORGANIZED HEALTH CARE EDUCATION/TRAINING PROGRAM

## 2024-05-06 PROCEDURE — 99205 OFFICE O/P NEW HI 60 MIN: CPT | Performed by: INTERNAL MEDICINE

## 2024-05-06 PROCEDURE — 99417 PROLNG OP E/M EACH 15 MIN: CPT | Performed by: INTERNAL MEDICINE

## 2024-05-06 RX ORDER — IOPAMIDOL 755 MG/ML
125 INJECTION, SOLUTION INTRAVASCULAR ONCE
Status: COMPLETED | OUTPATIENT
Start: 2024-05-06 | End: 2024-05-06

## 2024-05-06 RX ORDER — IOPAMIDOL 755 MG/ML
100 INJECTION, SOLUTION INTRAVASCULAR ONCE
Status: COMPLETED | OUTPATIENT
Start: 2024-05-06 | End: 2024-05-06

## 2024-05-06 RX ORDER — EVOLOCUMAB 140 MG/ML
140 INJECTION, SOLUTION SUBCUTANEOUS
COMMUNITY
Start: 2023-11-03

## 2024-05-06 RX ORDER — SENNOSIDES 8.6 MG
1300 CAPSULE ORAL 2 TIMES DAILY
COMMUNITY

## 2024-05-06 RX ADMIN — IOPAMIDOL 95 ML: 755 INJECTION, SOLUTION INTRAVASCULAR at 11:17

## 2024-05-06 ASSESSMENT — PAIN SCALES - GENERAL: PAINLEVEL: NO PAIN (0)

## 2024-05-06 NOTE — LETTER
5/6/2024         RE: Roxana Card  [address] [facility] [address]        Dear Colleague,    Thank you for referring your patient, Roxana Card, to the [facility]. Please see a copy of my visit note below.    Medical Oncology Note      Roxana Card    Age: 73 year old        CC: Breast Cancer      HPI: Roxana Card is a 73 year old postmenopausal woman with recent diagnosis of screen detected cT1cN0 HR+/HER2 negative IDC of the left breast. She presents today for med onc initial consultation.         Her full oncologic history is as follows:   Oncologic History  Patient Active Problem List    Diagnosis Date Noted    Malignant neoplasm of upper-outer quadrant of left breast in female, estrogen receptor positive (H) 05/01/2024     Priority: High     Left breast cancer  3/20/2023 last normal screening mammo    4/15/2024 screening mammo showing focal asymmetry in the outer left breast at 3:00, 12-14 cm FN    4/23/2024 left breast ultrasound showing a 1.7 x 1.6 x 1.5 cm mass at 2:00, 11 cm FN, corresponding to the mammographic abnormality.  US guided biopsy demonstrates a grade 2 IDC.  ER (3+,> 95%), TN (1+, 30%), HER2 2+ negative by FISH, Ki-67 22%    5/6/2024 Contrast enhanced mammo  left breast demonstrates 1.8 cm of enhancement. No other enhancements     5/6/2024 US of left axillary LN normal       Coronary artery disease involving native coronary artery of native heart without angina pectoris 05/06/2024     Priority: Medium    GERD (gastroesophageal reflux disease) 05/06/2024     Priority: Medium    Mixed hyperlipidemia 05/06/2024     Priority: Medium    Primary hypertension 05/06/2024     Priority: Medium    Vitamin D deficiency 05/06/2024     Priority: Medium    Recurrent UTI 09/20/2023     Priority: Medium    Stage 3a chronic kidney disease (H) 03/17/2022     Priority: Medium    Other microscopic colitis 11/23/2021     Priority: Medium    Bilateral primary  osteoarthritis of knee 2021     Priority: Medium    Age-related osteoporosis without current pathological fracture 2021     Priority: Medium    Mild intermittent asthma 2019     Priority: Medium          Interval History  Roxana presents accompanied by her supportive   for initial consultation.  She met with Dr. Fairbanks last week and is planned to undergo partial mastectomy and SLNB on 5/15/2024.    She denies any symptoms related to her left breast cancer including a palpable mass, pain, nipple discharge, or erythema of the skin.    She has otherwise been feeling well without any headaches, visual changes, cough, SOB, chest pain, n/v, constipation/diarrhea, abdominal pain, focal weakness or numbness.     They recently returned from a vacation in [place].      Past Medical History  Past Medical History:   Diagnosis Date    Age-related osteoporosis without current pathological fracture 2021    Breast cancer (H)     CAD (coronary artery disease)     GERD (gastroesophageal reflux disease) 2024    Microscopic colitis     resolved    Mild intermittent asthma 2019    Osteoarthritis          Past Surgical History  Past Surgical History:   Procedure Laterality Date    CV PCI  2016    HYSTERECTOMY, MEHRDAD      for prolapse    knee replacement Bilateral 10/23/2023         Family History  Family History   Problem Relation Age of Onset    Colon Cancer Father     Colon Cancer Paternal Aunt     Colon Cancer Paternal Uncle     Lung Cancer Paternal Uncle     Colon Cancer Cousin     Colon Cancer Other         8 out of 15 paternal aunt/uncles have colon cancer          Social History  Social History     Tobacco Use    Smoking status: Never    Smokeless tobacco: Never      Social History     Social History Narrative    Menarche 12    Menopause 55. HRT for 5 years      first at age 20.  both children     Youngest child at diagnosis    Retired from, but working part time in  " home          Current Medications:  Current Outpatient Medications   Medication Sig Dispense Refill    acetaminophen (TYLENOL) 650 MG CR tablet Take 1,300 mg by mouth      albuterol (PROAIR HFA/PROVENTIL HFA/VENTOLIN HFA) 108 (90 Base) MCG/ACT inhaler Inhale 2 puffs into the lungs      ASPIRIN LOW DOSE 81 MG chewable tablet CHEW AND SWALLOW 1 TAB IN THE MORNING AND 1 TAB IN THE EVENING FOR BLOOD CLOT PREVENTION.      azelastine (ASTELIN) 0.1 % nasal spray Spray 2 sprays in nostril      Calcium Polycarbophil (FIBER) 625 MG tablet Take 1,250 mg by mouth      cetirizine (ZYRTEC) 10 MG tablet Take 10 mg by mouth      clindamycin (CLEOCIN) 150 MG capsule Take 4 Capsules 1 hour prior to dental appointment.      diltiazem ER (CARDIZEM SR) 60 MG 12 hr capsule Take 60 mg by mouth      Multiple Vitamin (MULTI-DAY VITAMINS PO) Take 1 tablet by mouth daily      pantoprazole (PROTONIX) 40 MG EC tablet TAKE ONE TABLET BY MOUTH EVERY MORNING 30 MINUTES BEFORE BREAKFAST      REPATHA SURECLICK 140 MG/ML prefilled autoinjector Inject 140 mg Subcutaneous      triamcinolone (KENALOG) 0.1 % external cream Apply to the affected area(s) if needed in the morning, at noon, and before bedtime. External cream; APPLY A SMALL AMOUNT 3 TIMES DAILY AS DIRECTED      vitamin D3 (CHOLECALCIFEROL) 125 MCG (5000 UT) tablet Take 5,000 Units by mouth      nitroGLYcerin (NITROSTAT) 0.4 MG sublingual tablet Place 0.4 mg under the tongue (Patient not taking: Reported on 2024)           ECOG Performance Status: 0    Physical Examination:  /81 (BP Location: Right arm, Patient Position: Sitting, Cuff Size: Adult Large)   Pulse 87   Temp 97.7  F (36.5  C) (Oral)   Resp 16   Ht 1.522 m (4' 11.92\")   Wt 77.1 kg (170 lb)   SpO2 98%   BMI 33.29 kg/m    General:  Well appearing, well-nourished adult female in NAD.  HEENT:  Normocephalic.  Sclera anicteric.  MMM.  No lesions of the oropharynx.  Breast: Left breast 2-3:00 1cm density w/ " "indistict borders   Lymph:  No cervical, supraclavicular, or axillary LAD.  Chest:  CTA bilaterally.  No wheezes or crackles.  CV:  RRR.  No murmurs or gallops  Abd:  Soft/NT/ND.  BS normoactive.  No hepatosplenomegaly.  Ext:  No pitting edema of the bilateral lower extremities.  Pulses 2+ and symmetric.  Musculo:  Strength 5/5 throughout.  Neuro:  Cranial nerves grossly intact.  Psych:  Mood and affect appear normal.    Laboratory Data:  No results found for: \"WBC\", \"HGB\", \"HCT\", \"MCV\", \"PLT\"  No results found for: \"NA\", \"HCO3\", \"CREATININE\", \"BUN\", \"ANIONGAP\", \"GLUCOSE\"  No results found for: \"ALT\", \"AST\", \"GGT\", \"ALKPHOS\", \"BILITOT\"  No results found for: \"INR\", \"PT\"      Radiology data:  I have personally reviewed the images of / or the following radiology data: As detailed in the oncology timeline      Pathology and other data:  I have personally reviewed the following data: As detailed in the oncology timeline      Assessment and Recommendations  Roxana Card  is a 73 year old postmenopausal  woman with a new diagnosis of screen detected cT1cN0 HR+/HER2 negative IDC of the left breast.    I had a long discussion with the patient in which we reviewed her breast cancer diagnosis and workup history to date. I reviewed all of the relevant and available clinic notes, imaging, and pathology reports. We discussed the use of locoregional therapy to reduce risk of locoregional disease and the use of systemic therapies to primarily reduce risk of systemic recurrence. Informed her that locoregional breast cancer is curable and that the goal would be to reduced her risk of developing systemic disease, which can potentially be incurable. Based on the currently available information thus far, she has localized disease.    Because she has HR+ disease, she will need at least 5 years of hormone therapy. Since she is postmenopausal, I recommend aromatase inhibitors (AI). We did not discuss adverse effects today, but will do " so at her follow-up appointment with me.  With regards to systemic chemotherapy, discussed that it is unlikely that she will need adjuvant chemotherapy.  However in light of slightly elevated proliferation rate (Ki-67) as seen on her diagnostic biopsy and lower VT positivity, there is a chance she could have a luminal B disease, which can be molecularly high risk.  In that case, discussed that we can have further discussions about the potential benefit of adjuvant systemic chemotherapy.  Lastly, we briefly discussed adjuvant targeted therapy such as CDK 4/6 inhibitors.  Based on currently available information, I do not think that she will need this -although we will finalize decision after her final surgical pathology.    Finally, in light of her breast cancer diagnosis and extensive family history of colon cancer, I recommend that she undergo genetic testing.  This may at times guide our treatment decisions and be helpful information for her family members to have.  She is agreeable to undergoing this additional testing.      RECOMMENDATIONS  - Proceed with surgery first -will need referral to radiation therapy after surgery  - Will request Oncotype Dx  - She will need adjuvant endocrine therapy  - Germline genetic testing and referral to genetic counseling        75 minutes spent on the date of the encounter doing chart review, review of test results, interpretation of tests, patient visit, and documentation       Dame Carolyne MD   of Medicine  Division of Hematology, Oncology and Transplantation  [facility]

## 2024-05-06 NOTE — PROGRESS NOTES
[facility]: Cancer Care                                                                                          Met pt after she and her  met with Dr Barfield. Introduced the RNCC role and gave examples of ways we can support her. Also went over the phone tree and who to ask for what if she calls in for anything. Pt verbalized understanding with no questions.  Placed order for an Oncotype order#: [id number]    Signature:  Silvia Valdez, RNCC

## 2024-05-06 NOTE — NURSING NOTE
"Oncology Rooming Note    May 6, 2024 12:13 PM   Roxana Card is a 73 year old female who presents for:    Chief Complaint   Patient presents with    Oncology Clinic Visit     Malignant neoplasm of upper outer quadrant of left breast     Initial Vitals: /81 (BP Location: Right arm, Patient Position: Sitting, Cuff Size: Adult Large)   Pulse 87   Temp 97.7  F (36.5  C) (Oral)   Resp 16   Ht 1.522 m (4' 11.92\")   Wt 77.1 kg (170 lb)   SpO2 98%   BMI 33.29 kg/m   Estimated body mass index is 33.29 kg/m  as calculated from the following:    Height as of this encounter: 1.522 m (4' 11.92\").    Weight as of this encounter: 77.1 kg (170 lb). Body surface area is 1.81 meters squared.  No Pain (0) Comment: Data Unavailable   No LMP recorded. Patient has had a hysterectomy.  Allergies reviewed: Yes  Medications reviewed: Yes    Medications: Medication refills not needed today.  Pharmacy name entered into [employer]: [id number] [place] [address]    Frailty Screening:   Is the patient here for a new oncology consult visit in cancer care? 1. Yes. Over the past month, have you experienced difficulty or required a caregiver to assist with:   1. Balance, walking or general mobility (including any falls)? NO  2. Completion of self-care tasks such as bathing, dressing, toileting, grooming/hygiene?  NO  3. Concentration or memory that affects your daily life?  NO       Clinical concerns: none      Maria Isabel Rolle"

## 2024-05-06 NOTE — PROGRESS NOTES
Writer received Cancer Risk Management Program referral, referred for:    Genetic testing - breast cancer diagnosis and extensive family history of colon CA      Reviewed for appropriate plan, and sent to New Patient Scheduling for completion.

## 2024-05-10 ENCOUNTER — ANCILLARY PROCEDURE (OUTPATIENT)
Dept: ULTRASOUND IMAGING | Facility: CLINIC | Age: 74
End: 2024-05-10
Attending: SURGERY
Payer: COMMERCIAL

## 2024-05-10 ENCOUNTER — ANCILLARY PROCEDURE (OUTPATIENT)
Dept: MAMMOGRAPHY | Facility: CLINIC | Age: 74
End: 2024-05-10
Attending: SURGERY
Payer: COMMERCIAL

## 2024-05-10 DIAGNOSIS — Z17.0 MALIGNANT NEOPLASM OF UPPER-OUTER QUADRANT OF LEFT BREAST IN FEMALE, ESTROGEN RECEPTOR POSITIVE (H): ICD-10-CM

## 2024-05-10 DIAGNOSIS — C50.412 MALIGNANT NEOPLASM OF UPPER-OUTER QUADRANT OF LEFT BREAST IN FEMALE, ESTROGEN RECEPTOR POSITIVE (H): ICD-10-CM

## 2024-05-10 PROCEDURE — 19285 PERQ DEV BREAST 1ST US IMAG: CPT | Mod: LT | Performed by: RADIOLOGY

## 2024-05-14 ENCOUNTER — ANESTHESIA EVENT (OUTPATIENT)
Dept: SURGERY | Facility: AMBULATORY SURGERY CENTER | Age: 74
End: 2024-05-14
Payer: COMMERCIAL

## 2024-05-15 ENCOUNTER — TRANSFERRED RECORDS (OUTPATIENT)
Dept: HEALTH INFORMATION MANAGEMENT | Facility: CLINIC | Age: 74
End: 2024-05-15

## 2024-05-15 ENCOUNTER — ANESTHESIA (OUTPATIENT)
Dept: SURGERY | Facility: AMBULATORY SURGERY CENTER | Age: 74
End: 2024-05-15
Payer: COMMERCIAL

## 2024-05-15 ENCOUNTER — ANCILLARY PROCEDURE (OUTPATIENT)
Dept: NUCLEAR MEDICINE | Facility: CLINIC | Age: 74
End: 2024-05-15
Attending: SURGERY
Payer: COMMERCIAL

## 2024-05-15 ENCOUNTER — ANCILLARY PROCEDURE (OUTPATIENT)
Dept: MAMMOGRAPHY | Facility: CLINIC | Age: 74
End: 2024-05-15
Attending: SURGERY
Payer: COMMERCIAL

## 2024-05-15 ENCOUNTER — HOSPITAL ENCOUNTER (OUTPATIENT)
Facility: AMBULATORY SURGERY CENTER | Age: 74
Discharge: HOME | End: 2024-05-15
Attending: SURGERY | Admitting: SURGERY
Payer: COMMERCIAL

## 2024-05-15 VITALS
OXYGEN SATURATION: 94 % | SYSTOLIC BLOOD PRESSURE: 117 MMHG | HEART RATE: 79 BPM | RESPIRATION RATE: 16 BRPM | WEIGHT: 170 LBS | DIASTOLIC BLOOD PRESSURE: 56 MMHG | TEMPERATURE: 97.4 F | BODY MASS INDEX: 33.29 KG/M2

## 2024-05-15 DIAGNOSIS — C50.412 MALIGNANT NEOPLASM OF UPPER-OUTER QUADRANT OF LEFT BREAST IN FEMALE, ESTROGEN RECEPTOR POSITIVE (H): ICD-10-CM

## 2024-05-15 DIAGNOSIS — Z17.0 MALIGNANT NEOPLASM OF UPPER-OUTER QUADRANT OF LEFT BREAST IN FEMALE, ESTROGEN RECEPTOR POSITIVE (H): ICD-10-CM

## 2024-05-15 PROCEDURE — A9520 TC99 TILMANOCEPT DIAG 0.5MCI: HCPCS | Performed by: RADIOLOGY

## 2024-05-15 PROCEDURE — 19301 PARTIAL MASTECTOMY: CPT | Performed by: ANESTHESIOLOGY

## 2024-05-15 PROCEDURE — 88305 TISSUE EXAM BY PATHOLOGIST: CPT | Performed by: PATHOLOGY

## 2024-05-15 PROCEDURE — 99207 NM LYMPHOSCINTIGRAPHY INJECTION ONLY: CPT | Performed by: RADIOLOGY

## 2024-05-15 PROCEDURE — 64450 NJX AA&/STRD OTHER PN/BRANCH: CPT | Mod: 59 | Performed by: ANESTHESIOLOGY

## 2024-05-15 PROCEDURE — 19301 PARTIAL MASTECTOMY: CPT | Performed by: NURSE ANESTHETIST, CERTIFIED REGISTERED

## 2024-05-15 PROCEDURE — 99100 ANES PT EXTEME AGE<1 YR&>70: CPT | Performed by: NURSE ANESTHETIST, CERTIFIED REGISTERED

## 2024-05-15 PROCEDURE — 19301 PARTIAL MASTECTOMY: CPT | Mod: LT

## 2024-05-15 PROCEDURE — 99100 ANES PT EXTEME AGE<1 YR&>70: CPT | Performed by: ANESTHESIOLOGY

## 2024-05-15 PROCEDURE — 19301 PARTIAL MASTECTOMY: CPT | Mod: LT | Performed by: SURGERY

## 2024-05-15 PROCEDURE — 38525 BIOPSY/REMOVAL LYMPH NODES: CPT | Mod: LT | Performed by: SURGERY

## 2024-05-15 PROCEDURE — G8907 PT DOC NO EVENTS ON DISCHARG: HCPCS

## 2024-05-15 PROCEDURE — 38900 IO MAP OF SENT LYMPH NODE: CPT | Mod: LT | Performed by: SURGERY

## 2024-05-15 PROCEDURE — G8916 PT W IV AB GIVEN ON TIME: HCPCS

## 2024-05-15 PROCEDURE — 76098 X-RAY EXAM SURGICAL SPECIMEN: CPT | Performed by: RADIOLOGY

## 2024-05-15 PROCEDURE — 88307 TISSUE EXAM BY PATHOLOGIST: CPT | Performed by: PATHOLOGY

## 2024-05-15 PROCEDURE — 38525 BIOPSY/REMOVAL LYMPH NODES: CPT | Mod: LT

## 2024-05-15 RX ORDER — BUPIVACAINE HYDROCHLORIDE 2.5 MG/ML
INJECTION, SOLUTION EPIDURAL; INFILTRATION; INTRACAUDAL
Status: DISCONTINUED | OUTPATIENT
Start: 2024-05-15 | End: 2024-05-15

## 2024-05-15 RX ORDER — ONDANSETRON 4 MG/1
4 TABLET, ORALLY DISINTEGRATING ORAL EVERY 30 MIN PRN
Status: DISCONTINUED | OUTPATIENT
Start: 2024-05-15 | End: 2024-05-16 | Stop reason: HOSPADM

## 2024-05-15 RX ORDER — DEXAMETHASONE SODIUM PHOSPHATE 4 MG/ML
4 INJECTION, SOLUTION INTRA-ARTICULAR; INTRALESIONAL; INTRAMUSCULAR; INTRAVENOUS; SOFT TISSUE
Status: DISCONTINUED | OUTPATIENT
Start: 2024-05-15 | End: 2024-05-16 | Stop reason: HOSPADM

## 2024-05-15 RX ORDER — ONDANSETRON 2 MG/ML
4 INJECTION INTRAMUSCULAR; INTRAVENOUS EVERY 30 MIN PRN
Status: DISCONTINUED | OUTPATIENT
Start: 2024-05-15 | End: 2024-05-16 | Stop reason: HOSPADM

## 2024-05-15 RX ORDER — ACETAMINOPHEN 325 MG/1
975 TABLET ORAL ONCE
Status: COMPLETED | OUTPATIENT
Start: 2024-05-15 | End: 2024-05-15

## 2024-05-15 RX ORDER — FENTANYL CITRATE 50 UG/ML
25-100 INJECTION, SOLUTION INTRAMUSCULAR; INTRAVENOUS
Status: DISCONTINUED | OUTPATIENT
Start: 2024-05-15 | End: 2024-05-16 | Stop reason: HOSPADM

## 2024-05-15 RX ORDER — CEFAZOLIN SODIUM 2 G/50ML
2 SOLUTION INTRAVENOUS
Status: COMPLETED | OUTPATIENT
Start: 2024-05-15 | End: 2024-05-15

## 2024-05-15 RX ORDER — FENTANYL CITRATE 50 UG/ML
50 INJECTION, SOLUTION INTRAMUSCULAR; INTRAVENOUS
Status: COMPLETED | OUTPATIENT
Start: 2024-05-15 | End: 2024-05-15

## 2024-05-15 RX ORDER — PROPOFOL 10 MG/ML
INJECTION, EMULSION INTRAVENOUS PRN
Status: DISCONTINUED | OUTPATIENT
Start: 2024-05-15 | End: 2024-05-15

## 2024-05-15 RX ORDER — CEFAZOLIN SODIUM 2 G/50ML
2 SOLUTION INTRAVENOUS SEE ADMIN INSTRUCTIONS
Status: DISCONTINUED | OUTPATIENT
Start: 2024-05-15 | End: 2024-05-16 | Stop reason: HOSPADM

## 2024-05-15 RX ORDER — LIDOCAINE 40 MG/G
CREAM TOPICAL
Status: DISCONTINUED | OUTPATIENT
Start: 2024-05-15 | End: 2024-05-16 | Stop reason: HOSPADM

## 2024-05-15 RX ORDER — SODIUM CHLORIDE, SODIUM LACTATE, POTASSIUM CHLORIDE, CALCIUM CHLORIDE 600; 310; 30; 20 MG/100ML; MG/100ML; MG/100ML; MG/100ML
INJECTION, SOLUTION INTRAVENOUS CONTINUOUS
Status: DISCONTINUED | OUTPATIENT
Start: 2024-05-15 | End: 2024-05-16 | Stop reason: HOSPADM

## 2024-05-15 RX ORDER — NALOXONE HYDROCHLORIDE 0.4 MG/ML
0.1 INJECTION, SOLUTION INTRAMUSCULAR; INTRAVENOUS; SUBCUTANEOUS
Status: DISCONTINUED | OUTPATIENT
Start: 2024-05-15 | End: 2024-05-16 | Stop reason: HOSPADM

## 2024-05-15 RX ORDER — EPHEDRINE SULFATE 50 MG/ML
INJECTION, SOLUTION INTRAMUSCULAR; INTRAVENOUS; SUBCUTANEOUS PRN
Status: DISCONTINUED | OUTPATIENT
Start: 2024-05-15 | End: 2024-05-15

## 2024-05-15 RX ORDER — SODIUM CHLORIDE, SODIUM LACTATE, POTASSIUM CHLORIDE, CALCIUM CHLORIDE 600; 310; 30; 20 MG/100ML; MG/100ML; MG/100ML; MG/100ML
INJECTION, SOLUTION INTRAVENOUS CONTINUOUS PRN
Status: DISCONTINUED | OUTPATIENT
Start: 2024-05-15 | End: 2024-05-15

## 2024-05-15 RX ORDER — ONDANSETRON 2 MG/ML
INJECTION INTRAMUSCULAR; INTRAVENOUS PRN
Status: DISCONTINUED | OUTPATIENT
Start: 2024-05-15 | End: 2024-05-15

## 2024-05-15 RX ORDER — FENTANYL CITRATE 50 UG/ML
25 INJECTION, SOLUTION INTRAMUSCULAR; INTRAVENOUS EVERY 5 MIN PRN
Status: DISCONTINUED | OUTPATIENT
Start: 2024-05-15 | End: 2024-05-16 | Stop reason: HOSPADM

## 2024-05-15 RX ORDER — ISOSULFAN BLUE 50 MG/5ML
INJECTION, SOLUTION SUBCUTANEOUS PRN
Status: DISCONTINUED | OUTPATIENT
Start: 2024-05-15 | End: 2024-05-15 | Stop reason: HOSPADM

## 2024-05-15 RX ORDER — FENTANYL CITRATE 50 UG/ML
INJECTION, SOLUTION INTRAMUSCULAR; INTRAVENOUS PRN
Status: DISCONTINUED | OUTPATIENT
Start: 2024-05-15 | End: 2024-05-15

## 2024-05-15 RX ORDER — PROPOFOL 10 MG/ML
INJECTION, EMULSION INTRAVENOUS CONTINUOUS PRN
Status: DISCONTINUED | OUTPATIENT
Start: 2024-05-15 | End: 2024-05-15

## 2024-05-15 RX ORDER — LIDOCAINE HYDROCHLORIDE 20 MG/ML
INJECTION, SOLUTION INFILTRATION; PERINEURAL PRN
Status: DISCONTINUED | OUTPATIENT
Start: 2024-05-15 | End: 2024-05-15

## 2024-05-15 RX ORDER — DEXAMETHASONE SODIUM PHOSPHATE 4 MG/ML
INJECTION, SOLUTION INTRA-ARTICULAR; INTRALESIONAL; INTRAMUSCULAR; INTRAVENOUS; SOFT TISSUE PRN
Status: DISCONTINUED | OUTPATIENT
Start: 2024-05-15 | End: 2024-05-15

## 2024-05-15 RX ORDER — FENTANYL CITRATE 50 UG/ML
50 INJECTION, SOLUTION INTRAMUSCULAR; INTRAVENOUS EVERY 5 MIN PRN
Status: DISCONTINUED | OUTPATIENT
Start: 2024-05-15 | End: 2024-05-16 | Stop reason: HOSPADM

## 2024-05-15 RX ADMIN — DEXAMETHASONE SODIUM PHOSPHATE 8 MG: 4 INJECTION, SOLUTION INTRA-ARTICULAR; INTRALESIONAL; INTRAMUSCULAR; INTRAVENOUS; SOFT TISSUE at 10:19

## 2024-05-15 RX ADMIN — FENTANYL CITRATE 25 MCG: 50 INJECTION, SOLUTION INTRAMUSCULAR; INTRAVENOUS at 10:19

## 2024-05-15 RX ADMIN — PROPOFOL 25 MCG/KG/MIN: 10 INJECTION, EMULSION INTRAVENOUS at 10:11

## 2024-05-15 RX ADMIN — EPHEDRINE SULFATE 10 MG: 50 INJECTION, SOLUTION INTRAMUSCULAR; INTRAVENOUS; SUBCUTANEOUS at 10:57

## 2024-05-15 RX ADMIN — LIDOCAINE HYDROCHLORIDE 100 MG: 20 INJECTION, SOLUTION INFILTRATION; PERINEURAL at 10:11

## 2024-05-15 RX ADMIN — EPHEDRINE SULFATE 5 MG: 50 INJECTION, SOLUTION INTRAMUSCULAR; INTRAVENOUS; SUBCUTANEOUS at 11:59

## 2024-05-15 RX ADMIN — FENTANYL CITRATE 50 MCG: 50 INJECTION, SOLUTION INTRAMUSCULAR; INTRAVENOUS at 08:59

## 2024-05-15 RX ADMIN — ACETAMINOPHEN 975 MG: 325 TABLET ORAL at 08:12

## 2024-05-15 RX ADMIN — SODIUM CHLORIDE, SODIUM LACTATE, POTASSIUM CHLORIDE, CALCIUM CHLORIDE: 600; 310; 30; 20 INJECTION, SOLUTION INTRAVENOUS at 09:30

## 2024-05-15 RX ADMIN — BUPIVACAINE HYDROCHLORIDE 20 ML: 2.5 INJECTION, SOLUTION EPIDURAL; INFILTRATION; INTRACAUDAL at 09:00

## 2024-05-15 RX ADMIN — PROPOFOL 160 MG: 10 INJECTION, EMULSION INTRAVENOUS at 10:11

## 2024-05-15 RX ADMIN — CEFAZOLIN SODIUM 2 G: 2 SOLUTION INTRAVENOUS at 10:02

## 2024-05-15 RX ADMIN — ONDANSETRON 4 MG: 2 INJECTION INTRAMUSCULAR; INTRAVENOUS at 11:47

## 2024-05-15 RX ADMIN — SODIUM CHLORIDE, SODIUM LACTATE, POTASSIUM CHLORIDE, CALCIUM CHLORIDE: 600; 310; 30; 20 INJECTION, SOLUTION INTRAVENOUS at 09:10

## 2024-05-15 RX ADMIN — EPHEDRINE SULFATE 10 MG: 50 INJECTION, SOLUTION INTRAMUSCULAR; INTRAVENOUS; SUBCUTANEOUS at 11:45

## 2024-05-15 NOTE — ADDENDUM NOTE
Addendum  created 05/15/24 [id number] by Dano Guajardo MD    Child order released for a procedure order, Clinical Note Signed, Intraprocedure Blocks edited, SmartForm saved

## 2024-05-15 NOTE — ANESTHESIA PROCEDURE NOTES
Pectoralis Procedure Note    Pre-Procedure   Staff -        Anesthesiologist:  Dano Guajardo MD       Performed By: anesthesiologist       Location: pre-op       Procedure Start/Stop Times: 5/15/2024 9:00 AM and 5/15/2024 9:05 AM       Pre-Anesthestic Checklist: patient identified, IV checked, site marked, risks and benefits discussed, informed consent, monitors and equipment checked, pre-op evaluation, at physician/surgeon's request and post-op pain management  Timeout:       Correct Patient: Yes        Correct Procedure: Yes        Correct Site: Yes        Correct Position: Yes        Correct Laterality: Yes        Site Marked: Yes  Procedure Documentation  Procedure: Pectoralis             Pectoralis I and Pectoralis II       Laterality: left       Patient Position: supine       Patient Prep/Sterile Barriers: sterile gloves, mask       Skin prep: Chloraprep       Needle Type: short bevel       Needle Gauge: 21.        Needle Length (millimeters): 100        Ultrasound guided       1. Ultrasound was used to identify targeted nerve, plexus, vascular marker, or fascial plane and place a needle adjacent to it in real-time.       2. Ultrasound was used to visualize the spread of anesthetic in close proximity to the above referenced structure.       3. A permanent image is entered into the patient's record.       4. The visualized anatomic structures appeared normal.       5. There were no apparent abnormal pathologic findings.    Assessment/Narrative         The placement was negative for: blood aspirated, painful injection and site bleeding       Paresthesias: No.       Bolus given via needle..        Secured via.        Insertion/Infusion Method: Single Shot       Complications: none       Injection made incrementally with aspirations every 5 mL.    Medication(s) Administered   Bupivacaine 0.25% PF (Infiltration) - Infiltration   20 mL - 5/15/2024 9:00:00 AM  Bupivacaine liposome (Exparel) 1.3% LA inj susp  "(Infiltration) - Infiltration   20 mL - 5/15/2024 9:00:00 AM  Medication Administration Time: 5/15/2024 9:00 AM      FOR [facility]) ONLY:   Pain Team Contact information: please page the Pain Team Via [employer]. Search \"Pain\". During daytime hours, please page the attending first. At night please page the resident first.      "

## 2024-05-15 NOTE — ANESTHESIA PREPROCEDURE EVALUATION
"Anesthesia Pre-Procedure Evaluation    Patient: Roxana Card   MRN: [id number] : 1950        Procedure : Procedure(s):  LEFT Radiofrequency Identification Seed-Localized Segmental Mastectomy (=\"Lumpectomy\"), LEFT Axillary [place] Lymph Node Biopsy          Past Medical History:   Diagnosis Date    Age-related osteoporosis without current pathological fracture 2021    Breast cancer (H)     CAD (coronary artery disease)     GERD (gastroesophageal reflux disease) 2024    Microscopic colitis     resolved    Mild intermittent asthma 2019    Osteoarthritis       Past Surgical History:   Procedure Laterality Date    CV PCI  2016    HYSTERECTOMY, MEHRDAD      for prolapse    knee replacement Bilateral 10/23/2023      Allergies   Allergen Reactions    Penicillins Hives and Swelling    Sulfamethoxazole-Trimethoprim Hives and Swelling    Oxycodone Itching    Lisinopril Cough     ITCHING, TINGLING    Atorvastatin Other (See Comments)     Joint pain    Tramadol Nausea and Vomiting and Other (See Comments)     Dizziness      Social History     Tobacco Use    Smoking status: Never    Smokeless tobacco: Never   Substance Use Topics    Alcohol use: Not on file      Wt Readings from Last 1 Encounters:   05/15/24 77.1 kg (170 lb)        Anesthesia Evaluation            ROS/MED HX  ENT/Pulmonary:     (+)                      asthma                  Neurologic:       Cardiovascular:     (+)  hypertension- -  CAD -  - -                                      METS/Exercise Tolerance:     Hematologic:       Musculoskeletal:       GI/Hepatic:     (+) GERD,                   Renal/Genitourinary:     (+) renal disease, type: CRI,            Endo:       Psychiatric/Substance Use:       Infectious Disease:       Malignancy:       Other:            Physical Exam    Airway  airway exam normal      Mallampati: II   TM distance: > 3 FB   Neck ROM: full   Mouth opening: > 3 cm    Respiratory Devices and Support   " "      Dental       (+) Completely normal teeth      Cardiovascular          Rhythm and rate: regular and normal     Pulmonary   pulmonary exam normal        breath sounds clear to auscultation           OUTSIDE LABS:  CBC: No results found for: \"WBC\", \"HGB\", \"HCT\", \"PLT\"  BMP: No results found for: \"NA\", \"POTASSIUM\", \"CHLORIDE\", \"CO2\", \"BUN\", \"CR\", \"GLC\"  COAGS: No results found for: \"PTT\", \"INR\", \"FIBR\"  POC: No results found for: \"BGM\", \"HCG\", \"HCGS\"  HEPATIC: No results found for: \"ALBUMIN\", \"PROTTOTAL\", \"ALT\", \"AST\", \"GGT\", \"ALKPHOS\", \"BILITOTAL\", \"BILIDIRECT\", \"ARMIN\"  OTHER: No results found for: \"PH\", \"LACT\", \"A1C\", \"DEBI\", \"PHOS\", \"MAG\", \"LIPASE\", \"AMYLASE\", \"TSH\", \"T4\", \"T3\", \"CRP\", \"SED\"    Anesthesia Plan    ASA Status:  3    NPO Status:  NPO Appropriate    Anesthesia Type: General.     - Airway: LMA   Induction: Intravenous.   Maintenance: Balanced.        Consents    Anesthesia Plan(s) and associated risks, benefits, and realistic alternatives discussed. Questions answered and patient/representative(s) expressed understanding.     - Discussed:     - Discussed with:  Patient      - Extended Intubation/Ventilatory Support Discussed: No.      - Patient is DNR/DNI Status: No     Use of blood products discussed: No .     Postoperative Care    Pain management: Oral pain medications, IV analgesics, Peripheral nerve block (Single Shot), Multi-modal analgesia.   PONV prophylaxis: Ondansetron (or other 5HT-3), Background Propofol Infusion     Comments:               Dano Guajardo MD    I have reviewed the pertinent notes and labs in the chart from the past 30 days and (re)examined the patient.  Any updates or changes from those notes are reflected in this note.             # Drug Induced Platelet Defect: home medication list includes an antiplatelet medication  # Obesity: Estimated body mass index is 33.29 kg/m  as calculated from the following:    Height as of 5/6/24: 1.522 m (4' 11.92\").    Weight as of " this encounter: 77.1 kg (170 lb).

## 2024-05-15 NOTE — ANESTHESIA POSTPROCEDURE EVALUATION
"Patient: Roxana Card    Procedure: Procedure(s):  LEFT Radiofrequency Identification Seed-Localized Segmental Mastectomy (=\"Lumpectomy\"), LEFT Axillary [place] Lymph Node Biopsy       Anesthesia Type:  General    Note:  Disposition: Outpatient   Postop Pain Control: Uneventful            Sign Out: Well controlled pain   PONV: No   Neuro/Psych: Uneventful            Sign Out: Acceptable/Baseline neuro status   Airway/Respiratory: Uneventful            Sign Out: Acceptable/Baseline resp. status   CV/Hemodynamics: Uneventful            Sign Out: Acceptable CV status   Other NRE: NONE   DID A NON-ROUTINE EVENT OCCUR? No           Last vitals:  Vitals Value Taken Time   /53 05/15/24 1250   Temp 97.4  F (36.3  C) 05/15/24 1250   Pulse 75 05/15/24 1251   Resp 18 05/15/24 1251   SpO2 95 % 05/15/24 1251   Vitals shown include unfiled device data.    Electronically Signed By: Dano Guajardo MD  May 15, 2024  3:49 PM  "

## 2024-05-15 NOTE — ANESTHESIA PROCEDURE NOTES
Airway       Patient location during procedure: OR  Staff -        CRNA: Estella Mittal APRN CRNA       Performed By: CRNA  Consent for Airway        Urgency: elective  Indications and Patient Condition       Indications for airway management: flori-procedural       Induction type:intravenous       Mask difficulty assessment: 1 - vent by mask    Final Airway Details       Final airway type: supraglottic airway    Supraglottic Airway Details        Type: LMA       Brand: I-Gel       LMA size: 4    Post intubation assessment        Placement verified by: capnometry, equal breath sounds and chest rise        Number of attempts at approach: 1       Secured with: silk tape       Ease of procedure: easy       Dentition: Intact and Unchanged

## 2024-05-15 NOTE — ANESTHESIA CARE TRANSFER NOTE
"  Patient: Roxana Card    Procedure: Procedure(s):  LEFT Radiofrequency Identification Seed-Localized Segmental Mastectomy (=\"Lumpectomy\"), LEFT Axillary [place] Lymph Node Biopsy       Diagnosis: Malignant neoplasm of upper-outer quadrant of left breast in female, estrogen receptor positive (H) [C50.412, Z17.0]  Diagnosis Additional Information: No value filed.    Anesthesia Type:   General     Note:    Oropharynx: oropharynx clear of all foreign objects and spontaneously breathing  Level of Consciousness: drowsy  Oxygen Supplementation: face mask  Level of Supplemental Oxygen (L/min / FiO2): 3  Independent Airway: airway patency satisfactory and stable  Dentition: dentition unchanged  Vital Signs Stable: post-procedure vital signs reviewed and stable  Report to RN Given: handoff report given  Patient transferred to: PACU    Handoff Report: Identifed the Patient, Identified the Reponsible Provider, Reviewed the pertinent medical history, Discussed the surgical course, Reviewed Intra-OP anesthesia mangement and issues during anesthesia, Set expectations for post-procedure period and Allowed opportunity for questions and acknowledgement of understanding  Vitals:  Vitals Value Taken Time   BP     Temp     Pulse     Resp     SpO2         Electronically Signed By: RENNY Colunga CRNA  May 15, 2024  12:09 PM  "

## 2024-05-15 NOTE — OP NOTE
DATE OF SURGERY: May 15, 2024     SURGEON: America Fairbanks MD  ASSISTANT(S): Jesús Magana MD PGY-6    PREOPERATIVE DIAGNOSIS: Left breast cancer, upper outer quadrant  POSTOPERATIVE DIAGNOSIS: same    PROCEDURE(S):   Left axillary sentinel lymph node mapping and biopsy  Left RFID seed-localized segmental mastectomy     ANESTHESIA: General + Block    CLINICAL NOTE:  Roxana Cadr is a 73 year old woman with left breast cancer.  The risks and benefits of a left RFID seed-localized segmental mastectomy and axillary janie staging were discussed with the patient and she elected to proceed with informed consent.    OPERATIVE FINDINGS:  1. One sentinel lymph node removed.  2. Specimen radiograph confirmed presence of the RFID seed and surgical clip within the specimen. The biopsy clip had been unroofed during dissection, set aside and sent with the specimen; its position in the breast tissue was marked with the surgical clip.  3. I felt the inferior margin was a bit close; thus an additional inferior margin was taken, inked and sent to pathology.    OPERATIVE PROCEDURE:  The [facility] subareolar injection of technetium-labelled Tilmanocept.  The radiofrequency identification (RFID) seed localization images were personally reviewed by me prior to the procedure. The patient was brought to the operating room and placed in the supine position with appropriate padding for all of the pressure points.  A general anesthetic was administered by the Anesthesia team.  A surgical safety checklist was performed to confirm the patient's identity, the site and laterality of the procedure. Perioperative antibiotics (Ancef) was provided.  VTE prophylaxis was provided with serial compression devices. 3 mL of lymphazurin was injected into the left  subareolar space and the left  breast was massaged for 5 minutes.  The left  breast and axilla were then prepped and draped in the usual sterile fashion using  Chloraprep.     We began with the axilla.  An incision was made just below the hair-bearing area of the left axilla.  The clavipectoral fascia was incised to enter the axilla. The Neoprobe was used to identify the sentinel lymph nodes.  [place] lymph node #1 was blue and had an ex vivo count of 2485. The background count was 56. No other blue or palpable lymph nodes were found.  Thus no additional sentinel lymph nodes were sought.  All of the lymph nodes were sent to pathology individually. The wound was irrigated and hemostasis was achieved.  The incision was closed in two layers with interrupted 3-0 vicryl and a running subcuticular 4-0 monocryl.     Next, a curvilinear incision was made in the upper outer quadrant of the left breast.  Superficial skin flaps were raised.  The breast tissue denoted by the localizing RFID seed (ID [id number]) was dissected out, with careful attention paid to resect this area with a grossly normal margin of surrounding breast tissue.  During the inferior flap dissection at the lateral aspect, I did unroof the gelatinous material that contained the hydromark biopsy clip. This was set aside and sent with the specimen. Its location on the breast tissue was marked with a surgical clip. The dissection was carried out down to the pre-pectoral fat.  The specimen was inked and radiographed.  It was found to contain the RFID seed and surgical clip . The biopsy clip had been sent with the specimen and was also seen.  The specimen was then sent to pathology. I felt the inferior margin was a bit close, thus an additional inferior margin was taken, inked and sent to pathology. The wound was irrigated and hemostasis was achieved.  Hemoclips were placed to shelby the edges of the cavity: medial, inferior, lateral, superior, and posterior (deep).  Deep 3-0 vicryl sutures were placed to reapproximate the breast tissue to avoid a divot. The incision was closed in two layers with interrupted 3-0 vicryl and a  running subcuticular 4-0 monocryl. Steristrips and dressings were applied.   The patient tolerated the procedure well. The sponge, needle, instrument counts were correct.  The patient was then awakened and taken to recovery in stable fashion.     I was present and scrubbed for the entire above procedure.    Dr. Magana actively first assisted during this operation providing necessary retraction and exposure as well as maintaining hemostasis and clear operative field.  This was helpful in allowing the operation to proceed in a safe and expeditious manner.  They were present for the entire duration of the critical portions of the operation.    EBL: 2 mL    SPECIMEN(S):  A. Left axillary sentinel lymph node # 1  B. Left breast mass  C. Left breast, new inferior margin    POSTOPERATIVE PLANS:  Roxana Card will be discharged home today with wound care instructions and no prescription for analgesics.  She will follow-up with me in 2 weeks.     COMMISSION ON CANCER SYNOPTIC REPORT:  [place] Node Biopsy for Breast Cancer - Left  Operation performed with curative intent Yes   Tracer(s) used to identify sentinel nodes in the upfront surgery (non-neoadjuvant) setting Dye and Radioactive tracer   Tracer(s) used to identify sentinel nodes in the neoadjuvant setting N/A   All nodes (colored or non-colored) present at the end of a dye-filled lymphatic channel were removed Yes   All significantly radioactive nodes were removed Yes   All palpably suspicious nodes were removed N/A   Biopsy-proven positive nodes marked with clips prior to chemotherapy were identified and removed N/A     America Fairbanks MD MSc [facility] FACS  Associate Professor of Surgery  Division of Surgical Oncology  [facility]

## 2024-05-15 NOTE — DISCHARGE INSTRUCTIONS
You had 975 mg of Tylenol at 0815. You may repeat this after 2:15. Maximum amount of Tylenol/Acetaminophen in a 24 hour period is 4,000 mg..mgbl      POST OPERATIVE INSTRUCTIONS:  Following an Interscalene, Intraclavicular, or Axillary Block    Regional anesthesia is injected into or around the nerves to anesthetize the area supplied by that set of nerves.  It is a type of local anesthetic used to numb the specific area, and is used to control pain and decrease the need for narcotics following surgery.    Types of Regional Blocks:    INTERSCALENE:  A block injected into the neck area of the operative arm of patient having shoulder surgery.  INTRACLAVICULAR:  A block injected near the clavicle of the operative arm of the patient having arm surgery.  AXILLARY:  A block injected near the armpit of the operative arm of the patient having wrist or finger surgery.    Procedure:  The type of anesthesia your doctor used to numb your arm will usually not wear off for 4-6 hours, but may last as long as 12 or more hours.  You should be careful during that period, it is possible to injure your arm without being aware of the injury.  While your arm is numb, you should:                                     Avoid striking or bumping your arm                                     Avoid extreme hot or cold    Discomfort:  You will have a tingling and prickly sensation in your arm as the feeling begins to return.  You can also expect some discomfort.  The amount of discomfort is unpredictable, but if you have more pain than can be controlled with pain medication you may have received, you should notify your physician.  We strongly recommend starting your pain medication at bedtime if you haven't already done so.  This is in the event that the block wears off while you are asleep.

## 2024-05-24 LAB
PATH REPORT.ADDENDUM SPEC: ABNORMAL
PATH REPORT.COMMENTS IMP SPEC: ABNORMAL
PATH REPORT.COMMENTS IMP SPEC: YES
PATH REPORT.COMMENTS IMP SPEC: YES
PATH REPORT.FINAL DX SPEC: ABNORMAL
PATH REPORT.FINAL DX SPEC: ABNORMAL
PATH REPORT.GROSS SPEC: ABNORMAL
PATH REPORT.GROSS SPEC: ABNORMAL
PATH REPORT.MICROSCOPIC SPEC OTHER STN: ABNORMAL
PATH REPORT.MICROSCOPIC SPEC OTHER STN: ABNORMAL
PATH REPORT.RELEVANT HX SPEC: ABNORMAL
PATH REPORT.RELEVANT HX SPEC: ABNORMAL
PATH REPORT.SITE OF ORIGIN SPEC: ABNORMAL
PATHOLOGY SYNOPTIC REPORT: ABNORMAL
PHOTO IMAGE: ABNORMAL

## 2024-05-29 ENCOUNTER — OFFICE VISIT (OUTPATIENT)
Dept: SURGERY | Facility: CLINIC | Age: 74
End: 2024-05-29
Payer: COMMERCIAL

## 2024-05-29 ENCOUNTER — PATIENT OUTREACH (OUTPATIENT)
Dept: ONCOLOGY | Facility: CLINIC | Age: 74
End: 2024-05-29

## 2024-05-29 DIAGNOSIS — C50.412 MALIGNANT NEOPLASM OF UPPER-OUTER QUADRANT OF LEFT BREAST IN FEMALE, ESTROGEN RECEPTOR POSITIVE (H): Primary | ICD-10-CM

## 2024-05-29 DIAGNOSIS — Z17.0 MALIGNANT NEOPLASM OF UPPER-OUTER QUADRANT OF LEFT BREAST IN FEMALE, ESTROGEN RECEPTOR POSITIVE (H): Primary | ICD-10-CM

## 2024-05-29 PROCEDURE — 99024 POSTOP FOLLOW-UP VISIT: CPT | Performed by: SURGERY

## 2024-05-29 NOTE — PROGRESS NOTES
FOLLOW-UP  May 29, 2024    Roxana Card is a 73 year old female who returns for her 1st post-operative follow-up visit.     Cancer Staging   Malignant neoplasm of upper-outer quadrant of left breast in female, estrogen receptor positive (H)  Staging form: Breast, AJCC 8th Edition  - Clinical: Stage IA (cT1c, cN0, cM0, G2, ER+, [place]+, HER2-) - Signed by America Fairbanks MD on 5/1/2024    HPI:    She underwent a left RFID seed-localized segmental mastectomy and axillary sentinel lymph node mapping and biopsy on 5/15/2024.  She is currently 2 week(s) post-op.  Final surgical pathology showed a pT1cN0 invasive ductal carcinoma with uninvolved margins and 0/1 lymph nodes.    Since the procedure, she has been doing well. She denies any redness or swelling, or drainage from the incision, or fever/chills.  She has good range of motion and denies any upper extremity swelling. She has been playing [employer] ball without issues.    She did note a blister on her upper lip from surgery and feels that her tongue is numb.    Of note, her Oncotype was low at 10. Adjuvant chemotherapy will not be recommended.    There were no vitals taken for this visit.   Physical Exam  Constitutional:       Appearance: She is well-developed.   Pulmonary:      Effort: No respiratory distress.   Chest:      Comments: Breast incision healing well.  No seroma. No cellulitis or abscess. No breast contour abnormality or nipple-areolar complex distortion.   Lymphadenopathy:      Comments: Axillary incision healing well without seroma, hematoma, or cellulitis.    Skin:     General: Skin is warm and dry.         INVESTIGATIONS:    Surgical Pathology (5/15/2024):  Final Diagnosis   A. Lymph node, LEFT axillary, sentinel #1, excision:  -One benign lymph node (0/1)     B. LEFT breast, upper outer quadrant, RFID localized segmental mastectomy:  -INVASIVE BREAST CARCINOMA OF NO SPECIAL TYPE (INVASIVE DUCTAL CARCINOMA), BYRON GRADE 2, size 16 mm  -No  lymphovascular invasion identified  -Margins are uninvolved by invasive carcinoma  -Invasive carcinoma is 4 mm from the nearest margin (anterior) and > 5 mm from the remaining margins  -Atypical lobular hyperplasia (ALH)  -Other findings: Few microcysts with apocrine metaplasia  -Calcifications associated with invasive carcinoma and benign acini  -Prior core biopsy site changes  -See tumor synoptic below     C. LEFT breast, new inferior margin, excision:  -Benign breast tissue with fibrocystic change (including microcysts)  -Calcifications associated with benign acini  -Negative for atypia or malignancy        ASSESSMENT:    Roxana Card is a 73 year old female with LEFT breast cancer, s/p surgery.    She is healing well.  I recommended she start moisturizing and massaging the scar with Vaseline.    I will ask our anesthesia team to reach out about her mouth symptoms.    We reviewed the pathology today and a copy of the report was provided. No further surgery is indicated.  We reviewed the role of Oncotype DX in decision-making for adjuvant chemotherapy; it was low and chemotherapy is not recommended.  She will follow up with Dr Barfield to further discuss.  We reviewed that she will also need adjuvant radiation therapy to complete breast conservation therapy; a referral to Radiation Oncology will be made. I will see her on an as needed basis.     All of the above was discussed with the patient and all questions were answered.     PLAN:  Radiation oncology referral  Follow up with Dr Barfield as scheduled  Follow up with me PRN  I have asked our Anesthesia team to reach out to Roxana Card     America Fairbanks MD MS [facility] FACS  Associate Professor of Surgery  Division of Surgical Oncology  [facility]

## 2024-05-29 NOTE — NURSING NOTE
Roxana Card's goals for this visit include:   Chief Complaint   Patient presents with    Surgical Followup     2 weeks post op left breast lumpectomy       She requests these members of her care team be copied on today's visit information: no    PCP: Asmita Randolph    Referring Provider:  Referred Self, MD  No address on file    There were no vitals taken for this visit.    Do you need any medication refills at today's visit? No    Olivia Marcelino LPN

## 2024-05-29 NOTE — LETTER
5/29/2024         RE: Roxana Card  [address] [address]        Dear Colleague,    Thank you for referring your patient, Roxana Card, to the [facility]. Please see a copy of my visit note below.    FOLLOW-UP  May 29, 2024    Roxana Card is a 73 year old female who returns for her 1st post-operative follow-up visit.     Cancer Staging   Malignant neoplasm of upper-outer quadrant of left breast in female, estrogen receptor positive (H)  Staging form: Breast, AJCC 8th Edition  - Clinical: Stage IA (cT1c, cN0, cM0, G2, ER+, [place]+, HER2-) - Signed by America Fairbanks MD on 5/1/2024    HPI:    She underwent a left RFID seed-localized segmental mastectomy and axillary sentinel lymph node mapping and biopsy on 5/15/2024.  She is currently 2 week(s) post-op.  Final surgical pathology showed a pT1cN0 invasive ductal carcinoma with uninvolved margins and 0/1 lymph nodes.    Since the procedure, she has been doing well. She denies any redness or swelling, or drainage from the incision, or fever/chills.  She has good range of motion and denies any upper extremity swelling. She has been playing pickle ball without issues.    She did note a blister on her upper lip from surgery and feels that her tongue is numb.    Of note, her Oncotype was low at 10. Adjuvant chemotherapy will not be recommended.    There were no vitals taken for this visit.   Physical Exam  Constitutional:       Appearance: She is well-developed.   Pulmonary:      Effort: No respiratory distress.   Chest:      Comments: Breast incision healing well.  No seroma. No cellulitis or abscess. No breast contour abnormality or nipple-areolar complex distortion.   Lymphadenopathy:      Comments: Axillary incision healing well without seroma, hematoma, or cellulitis.    Skin:     General: Skin is warm and dry.         INVESTIGATIONS:    Surgical Pathology (5/15/2024):  Final Diagnosis   A. Lymph node, LEFT  axillary, sentinel #1, excision:  -One benign lymph node (0/1)     B. LEFT breast, upper outer quadrant, RFID localized segmental mastectomy:  -INVASIVE BREAST CARCINOMA OF NO SPECIAL TYPE (INVASIVE DUCTAL CARCINOMA), BYRON GRADE 2, size 16 mm  -No lymphovascular invasion identified  -Margins are uninvolved by invasive carcinoma  -Invasive carcinoma is 4 mm from the nearest margin (anterior) and > 5 mm from the remaining margins  -Atypical lobular hyperplasia (ALH)  -Other findings: Few microcysts with apocrine metaplasia  -Calcifications associated with invasive carcinoma and benign acini  -Prior core biopsy site changes  -See tumor synoptic below     C. LEFT breast, new inferior margin, excision:  -Benign breast tissue with fibrocystic change (including microcysts)  -Calcifications associated with benign acini  -Negative for atypia or malignancy        ASSESSMENT:    Roxana Card is a 73 year old female with LEFT breast cancer, s/p surgery.    She is healing well.  I recommended she start moisturizing and massaging the scar with Vaseline.    I will ask our anesthesia team to reach out about her mouth symptoms.    We reviewed the pathology today and a copy of the report was provided. No further surgery is indicated.  We reviewed the role of Oncotype DX in decision-making for adjuvant chemotherapy; it was low and chemotherapy is not recommended.  She will follow up with Dr Barfield to further discuss.  We reviewed that she will also need adjuvant radiation therapy to complete breast conservation therapy; a referral to Radiation Oncology will be made. I will see her on an as needed basis.     All of the above was discussed with the patient and all questions were answered.     PLAN:  Radiation oncology referral  Follow up with Dr Barfield as scheduled  Follow up with me PRN  I have asked our Anesthesia team to reach out to Roxana Card     America Fairbanks MD MS [facility] FACS  Associate Professor of Surgery  Division  of Surgical Oncology  [facility]       Again, thank you for allowing me to participate in the care of your patient.        Sincerely,        America Fairbanks MD

## 2024-05-29 NOTE — PROGRESS NOTES
Virtual Visit Details  Type of service:  Video Visit   Originating Location (pt. Location): Home  Distant Location (provider location):  Off-site  Platform used for Video Visit: [employer]    5/30/2024    Referring Provider: Dame Carolyne MD    Presenting Information:   I met with Roxana Card today, along with her  Hua, for her video genetic counseling visit through the Cancer Risk Management Program to discuss her personal history of breast cancer and family history of colon cancer. She is here today to review this history, cancer screening recommendations, and available genetic testing options.    Personal History:  Roxana is a 73 year old female. She was recently diagnosed with breast cancer. On 4/15/2024, a screening mammogram found focal asymmetry in the left breast. An ultrasound on 4/23/2024 showed an irregular, hyperechoic, shadowing mass. A biopsy revealed invasive ductal carcinoma (ER/CT +, HER2 -). Roxana underwent a lumpectomy on 5/15/2024. She has already had some genetic testing ordered by Dr. Barfield via the Breast Actionable Panel offered by the [facility] at [facility]. We reviewed this panel in detail today.    In her hormonal-based medical history, she had her first menstrual period at age 12, her first child at age 20, and underwent menopause at age 55. Roxana underwent a hysterectomy in 2007. She still has her ovaries and fallopian tubes in place, and reports no ovarian cancer screening to date. She reports no history of hormone replacement therapy. Roxana reports oral contraceptive use for approximately 4 years.       Roxana began having colonoscopies at the age of 45. Her most recent colonoscopy in October 2021 was normal and follow-up was recommended in 5 years. Roxana reports a history of dermatological exams. She does not regularly do any other cancer screening at this time. Roxana reported no history of nicotine or tobacco use and occasional alcohol  use.    Family History: (Please see scanned pedigree for detailed family history information)  Both of Roxana's brothers have been found to have some colon polyps. One brother is currently 79, while the other passed away at age 68.   ADDENDUM 6/20/2024: Maternal first cousin was diagnosed with breast cancer in her 60's. She is currently 76.   ADDENDUM 6/20/2024: Her daughter (Roxana's first cousin once removed) was diagnosed with breast cancer in her in her 40's. She is currently 55.  ADDENDUM 6/25/2024: Another maternal first cousin was diagnosed with breast cancer in her 70's. She is currently 87.  Roxana's father was diagnosed with colon cancer in his late 60's/early 70's. He passed away at age 71.  Paternal aunt was diagnosed with colon cancer in her 70's and passed away shortly after.  Five paternal uncles were diagnosed with colon cancer over the age of 50. They have all passed away.  Another paternal uncle was diagnosed with lung cancer at age 54 and passed away shortly after. It was reported that he was a smoker.   Five male paternal first cousins (none of which were siblings to each other) were diagnosed with colon cancer in their 50's/60's. All have since passed away.   Of note, there is no history of cancer on her maternal side of her family.   Her maternal ethnicity is [place]. Her paternal ethnicity is [place].  There is no known Ashkenazi Congregational ancestry on either side of her family. There is no reported consanguinity.    Discussion:  Roxana's personal history of breast cancer and family history of colon cancer is suggestive of a hereditary cancer syndrome.  We briefly reviewed basic genetics principles. Many of the genes we are born with impact our risk of certain diseases, such as cancer.  When one of these genes is not working properly due to a mistake (known as a  mutation  or  variant ), this may lead to an increased risk of developing cancer.   We reviewed the features of sporadic, familial, and  hereditary cancers.   Cancer is a common diagnosis which impacts many families. The vast majority of cancers are considered sporadic and not primarily due to an inherited factor. Individuals can develop cancer due to aging, chance events, environmental exposures or lifestyles.  Only ~5-10% of cancer diagnoses are thought to be caused by inheriting a mutation or variant within a single cancer susceptibility gene. Features typically seen in these high risk families include: cancers diagnosed under age 50, multiple relatives with similar cancers on the same side of the family, cancers in multiple generations, and relatives with certain rare cancers (i.e., male breast cancer).   We discussed the natural history and genetics of several hereditary cancer syndromes, including Hereditary Breast and Ovarian Cancer Syndrome (HBOC) and Marcial syndrome.  The most common cause of hereditary breast and ovarian cancer is HBOC, which is caused by mutations in the BRCA1 and BRCA2 genes. Individuals with HBOC syndrome are at increased risk for several different cancers including: female and make breast, ovarian cancer, prostate cancer, melanoma, and pancreatic cancer. HBOC typically presents with multiple family members diagnosed with breast cancer before age 50 and/or ovarian cancer.   Marcial syndrome can be caused by a mutation in one of five genes:  MLH1, MSH2, MSH6, PMS2, and EPCAM. A single mutation in one of the Marcial Syndrome genes increases the risk for several cancers, such as colon cancer, endometrial/uterine cancer, gastric cancer, and ovarian cancer. Other cancers have also been reported in some families with Marcial Syndrome include pancreatic, bladder, biliary tract, urothelial, small bowel, prostate, breast and brain cancers.  A detailed handout regarding HBOC, Marcial syndrome, and the information we discussed will be provided to Roxana via [employer] and can be found in the after visit summary. Topics included: inheritance  pattern, cancer risks, cancer screening recommendations, and also risks, benefits and limitations of testing.  In looking at Roxana's personal and family history, it is possible that a cancer susceptibility gene is present due to her father diagnosed with colon cancer in his late 60's/early 70's, five paternal uncles diagnosed with colon cancer, a paternal aunt diagnosed with colon cancer in her 70's, and five paternal first cousins diagnosed with colon cancer in their 50's/60's.  Based on her personal and family history, Roxana meets current National Comprehensive Cancer Network (NCCN) criteria for genetic testing of Marcial syndrome genes (i.e. EPCAM, MLH1, MSH2, MSH6, and PMS2).   In looking at Roxana's personal and family history, it is possible that a cancer susceptibility gene is present due to her personal history of breast cancer at age 73.  Roxana qualifies for genetic testing due to her breast cancer diagnoses. The American Society of Breast Surgeons Consensus Guideline on Genetic Testing for Hereditary Breast Cancer state that genetic testing should be available to all patients who have been diagnosed with breast cancer. The guidelines state that testing should include BRCA1, BRCA2, and PALB2, and that additional testing may be warranted based on personal and/or family history.   We discussed the Breast Actionable Panel, ordered by Dr. Barfield. This panel consists of 11 genes associated with high or moderate risk for breast cancer: KEESHA, BARD1, BRCA1, BRCA2, CDH1, CHEK2, NF1, PALB2, PTEN, STK11, TP53. We reviewed the genes on this panel, and discussed that there are additional genes that could cause increased risk for breast cancer that weren't included in her testing, it is possible that a mutation could be identified in Roxana in one of these other breast cancer associated genes. Therefore, it would be appropriate for Roxana to consider more comprehensive genetic testing related to genes associated with  breast cancer to better understand her risk for hereditary cancer.  We reviewed additional genetic testing options available for Roxana based on her personal and family history: a panel of genes associated with an increased risk for hereditary breast, gynecologic, and colon cancers, or larger panel options to include genes associated with increased risk for multiple different cancer types. Roxana expressed interest in the Marcial syndrome genes with automatic reflex to the Hereditary Cancer Comprehensive Expanded panel through [facility].   Roxana will need to submit a blood sample for her genetic testing. She will go to her nearest [facility] clinic at her earliest convenience to get her blood drawn for her genetic testing. Verbal consent was given over video and written on the consent form. Turnaround time for the additional testing ordered today is approximately 4 weeks.  We discussed that someone from Dr. Barfield's team will contact Roxana with the results of the Breast Actionable panel once they are complete. I will contact Roxana with the results of the Hereditary Cancer Comprehensive Expanded panel once they are complete.   Medical Management: For Roxana, we reviewed that the information from genetic testing may determine:  additional cancer screening for which Roxana may qualify (i.e. mammogram and breast MRI, more frequent colonoscopies, more frequent dermatologic exams, etc.),  options for risk reducing surgeries Roxana could consider (i.e. bilateral mastectomy, surgery to remove her ovaries and/or uterus, etc.),    and targeted chemotherapies if she were to develop certain cancers in the future (i.e. immunotherapy for individuals with Marcial syndrome, PARP inhibitors, etc.).   These recommendations and possible targeted chemotherapies will be discussed in detail once genetic testing is completed.     Plan:  1) Today Roxana elected to proceed with the Marcial  syndrome genes with automatic reflex to the Hereditary Cancer Comprehensive Expanded panel through [facility]. Therefore, consent was reviewed and verbally obtained for this testing.  2) Roxana plans to schedule her blood draw appointment at a clinic that is convenient to her.  3) The results of the additional testing ordered today should be available in 4 weeks.  4) We discussed that someone from Dr. Barfield's team will contact Roxana with the results of the Breast Actionable panel once they are complete.   5) Roxana will either have a telephone visit or video visit with me to discuss the results of the Hereditary Cancer Comprehensive Expanded panel. Additional recommendations about screening will be made at that time.    Roxana is 73 year old and is being evaluated via a billable video visit.    Time spent on video: 33 minutes    ADDENDUM 6/5/2024   Despite having a genetic counseling note for the prior authorization, Roxana's insurance will not cover her genetic testing. Therefore, her team has cancelled her genetic testing through [facility].  I reached out to Roxana to discuss alternative patient-pay genetic testing options with a different laboratory, but she was unavailable. I left my contact information for her to reach out.     Shruti James MS, [facility] [place]  Licensed, Certified Genetic Counselor  Phone: [phone]

## 2024-05-30 ENCOUNTER — VIRTUAL VISIT (OUTPATIENT)
Dept: ONCOLOGY | Facility: CLINIC | Age: 74
End: 2024-05-30
Attending: INTERNAL MEDICINE
Payer: COMMERCIAL

## 2024-05-30 DIAGNOSIS — C50.412 MALIGNANT NEOPLASM OF UPPER-OUTER QUADRANT OF LEFT BREAST IN FEMALE, ESTROGEN RECEPTOR POSITIVE (H): ICD-10-CM

## 2024-05-30 DIAGNOSIS — Z17.0 MALIGNANT NEOPLASM OF UPPER-OUTER QUADRANT OF LEFT BREAST IN FEMALE, ESTROGEN RECEPTOR POSITIVE (H): ICD-10-CM

## 2024-05-30 DIAGNOSIS — Z80.1 FAMILY HISTORY OF LUNG CANCER: ICD-10-CM

## 2024-05-30 DIAGNOSIS — Z80.0 FAMILY HISTORY OF COLON CANCER: Primary | ICD-10-CM

## 2024-05-30 PROCEDURE — 96040 HC GENETIC COUNSELING, EACH 30 MINUTES: CPT | Mod: GT,95

## 2024-05-30 NOTE — Clinical Note
5/30/2024         RE: Roxana Card  [address] [address]        Dear Colleague,    Thank you for referring your patient, Roxana Card, to the [facility]. Please see a copy of my visit note below.    Virtual Visit Details  Type of service:  Video Visit   Originating Location (pt. Location): Home  Distant Location (provider location):  Off-site  Platform used for Video Visit: [facility]    5/30/2024    Referring Provider: America Fairbanks MD     Presenting Information:   I met with Roxana Card today, along with her  Hua, for her video genetic counseling visit through the Cancer Risk Management Program to discuss her personal history of breast cancer and family history of colon cancer. She is here today to review this history, cancer screening recommendations, and available genetic testing options.    Personal History:  Roxana is a 73 year old female. She was recently diagnosed with breast cancer. On 4/15/2024, a screening mammogram found focal asymmetry in the left breast. An ultrasound on 4/23/2024 showed an irregular, hyperechoic, shadowing mass. A biopsy revealed invasive ductal carcinoma (ER/CA +, HER2 -). Roxana underwent a lumpectomy on 5/15/2024. She has already had some genetic testing ordered by Dr. Barfield via the Breast Actionable Panel offered by the [facility] at [facility]. Results are currently pending. We reviewed this panel in detail today.    In her hormonal-based medical history, she had her first menstrual period at age 12, her first child at age 20, and underwent menopause at age 55. Roxana underwent a hysterectomy in 2007. She still has her ovaries and fallopian tubes in place, and reports no ovarian cancer screening to date. She reports no history of hormone replacement therapy. Roxana reports oral contraceptive use for approximately 4 years.       Roxana began having colonoscopies at the age of 45.  Her most recent colonoscopy in October 2021 was normal and follow-up was recommended in 5 years. Roxana reports a history of dermatological exams. She does not regularly do any other cancer screening at this time. Roxana reported no history of nicotine or tobacco use and occasional alcohol use.    Family History: (Please see scanned pedigree for detailed family history information)  Both of Roxana's brothers have been found to have some colon polyps. One brother is currently 79, while the other passed away at age 68.   Roxana's father was diagnosed with colon cancer in his late 60's/early 70's. He passed away at age 71.  Paternal aunt was diagnosed with colon cancer in her 70's and passed away shortly after.  Five paternal uncles were diagnosed with colon cancer over the age of 50. They have all passed away.  Another paternal uncle was diagnosed with lung cancer at age 54 and passed away shortly after. It was reported that he was a smoker.   Five male paternal first cousins (none of which were siblings to each other) were diagnosed with colon cancer in their 50's/60's. All have since passed away.   Of note, there is no history of cancer on her maternal side of her family.   Her maternal ethnicity is Citizen of the [place]. Her paternal ethnicity is [place].  There is no known Ashkenazi Synagogue ancestry on either side of her family. There is no reported consanguinity.    Discussion:  Rxoana's personal history of breast cancer and family history of colon cancer is suggestive of a hereditary cancer syndrome.  We briefly reviewed basic genetics principles. Many of the genes we are born with impact our risk of certain diseases, such as cancer.  When one of these genes is not working properly due to a mistake (known as a  mutation  or  variant ), this may lead to an increased risk of developing cancer.   We reviewed the features of sporadic, familial, and hereditary cancers.   Cancer is a common diagnosis which impacts many families.  The vast majority of cancers are considered sporadic and not primarily due to an inherited factor. Individuals can develop cancer due to aging, chance events, environmental exposures or lifestyles.  Only ~5-10% of cancer diagnoses are thought to be caused by inheriting a mutation or variant within a single cancer susceptibility gene. Features typically seen in these high risk families include: cancers diagnosed under age 50, multiple relatives with similar cancers on the same side of the family, cancers in multiple generations, and relatives with certain rare cancers (i.e., male breast cancer).   We discussed the natural history and genetics of several hereditary cancer syndromes, including Hereditary Breast and Ovarian Cancer Syndrome (HBOC) and Marcial syndrome.  The most common cause of hereditary breast and ovarian cancer is HBOC, which is caused by mutations in the BRCA1 and BRCA2 genes. Individuals with HBOC syndrome are at increased risk for several different cancers including: female and make breast, ovarian cancer, prostate cancer, melanoma, and pancreatic cancer. HBOC typically presents with multiple family members diagnosed with breast cancer before age 50 and/or ovarian cancer.   Marcial syndrome can be caused by a mutation in one of five genes:  MLH1, MSH2, MSH6, PMS2, and EPCAM. A single mutation in one of the Marcial Syndrome genes increases the risk for several cancers, such as colon cancer, endometrial/uterine cancer, gastric cancer, and ovarian cancer. Other cancers have also been reported in some families with Marcial Syndrome include pancreatic, bladder, biliary tract, urothelial, small bowel, prostate, breast and brain cancers.  A detailed handout regarding HBOC, Marcial syndrome, and the information we discussed will be provided to Roxana via [employer] and can be found in the after visit summary. Topics included: inheritance pattern, cancer risks, cancer screening recommendations, and also risks, benefits  and limitations of testing.  In looking at Roxana's personal and family history, it is possible that a cancer susceptibility gene is present due to her father diagnosed with colon cancer in his late 60's/early 70's, five paternal uncles diagnosed with colon cancer, a paternal aunt diagnosed with colon cancer in her 70's, and five paternal first cousins diagnosed with colon cancer in their 50's/60's.  Based on her personal and family history, Roxana meets current National Comprehensive Cancer Network (NCCN) criteria for genetic testing of Marcial syndrome genes (i.e. EPCAM, MLH1, MSH2, MSH6, and PMS2).   In looking at Roxana's personal and family history, it is possible that a cancer susceptibility gene is present due to her personal history of breast cancer at age 73.  Roxana qualifies for genetic testing due to her breast cancer diagnoses. The American Society of Breast Surgeons Consensus Guideline on Genetic Testing for Hereditary Breast Cancer state that genetic testing should be available to all patients who have been diagnosed with breast cancer. The guidelines state that testing should include BRCA1, BRCA2, and PALB2, and that additional testing may be warranted based on personal and/or family history.   As discussed above, the Breast Actionable Panel, ordered by Dr. Barfield,  is currently pending. This panel consists of 11 genes associated with high or moderate risk for breast cancer: KEESHA, BARD1, BRCA1, BRCA2, CDH1, CHEK2, NF1, PALB2, PTEN, STK11, TP53. We reviewed the genes on this panel, and discussed that there are additional genes that could cause increased risk for breast cancer that weren't included in her testing, it is possible that a mutation could be identified in Roxana in one of these other breast cancer associated genes. Therefore, it would be appropriate for Roxana to consider more comprehensive genetic testing related to genes associated with breast cancer to better understand her risk for  hereditary cancer.  We reviewed additional genetic testing options available for Roxana based on her personal and family history: a panel of genes associated with an increased risk for hereditary breast, gynecologic, and colon cancers, or larger panel options to include genes associated with increased risk for multiple different cancer types. Roxana expressed interest in the Marcial syndrome genes with automatic reflex to the Hereditary Cancer Comprehensive Expanded panel through [facility].   Roxana's blood has already been drawn for testing and is currently at the lab. Verbal consent was given over video and written on the consent form. Turnaround time for the additional testing ordered today is approximately 4 weeks.  We discussed that someone from Dr. Barfield's team will contact Roxana with the results of the Breast Actionable panel once they are complete. I will contact Roxana with the results of the Hereditary Cancer Comprehensive Expanded panel once they are complete.   Medical Management: For Roxana, we reviewed that the information from genetic testing may determine:  additional cancer screening for which Roxana may qualify (i.e. mammogram and breast MRI, more frequent colonoscopies, more frequent dermatologic exams, etc.),  options for risk reducing surgeries Roxana could consider (i.e. bilateral mastectomy, surgery to remove her ovaries and/or uterus, etc.),    and targeted chemotherapies if she were to develop certain cancers in the future (i.e. immunotherapy for individuals with Marcial syndrome, PARP inhibitors, etc.).   These recommendations and possible targeted chemotherapies will be discussed in detail once genetic testing is completed.     Plan:  1) Today Roxana elected to proceed with the Marcial syndrome genes with automatic reflex to the Hereditary Cancer Comprehensive Expanded panel through [facility]. Therefore, consent  was reviewed and verbally obtained for this testing.  2) Her blood has already been drawn and is currently at the lab.    3) The results of the additional testing ordered today should be available in 4 weeks.  4) We discussed that someone from Dr. Barfield's team will contact Roxana with the results of the Breast Actionable panel once they are complete.   5) Roxana will either have a telephone visit or video visit with me to discuss the results of the Hereditary Cancer Comprehensive Expanded panel. Additional recommendations about screening will be made at that time.    Roxana is 73 year old and is being evaluated via a billable video visit.    Time spent on video: 33 minutes    Shruti James MS, [facility] [place]  Licensed, Certified Genetic Counselor  Phone: [phone]      Again, thank you for allowing me to participate in the care of your patient.        Sincerely,        Shruti James GC

## 2024-05-30 NOTE — PROGRESS NOTES
New Patient Oncology Nurse Navigator Note     Referring provider: Aemrica Fairbanks MD      Referring Clinic/Organization: [facility] Surgery     Referred to (specialty:) Radiation Oncology      Date Referral Received: May 29, 2024     Evaluation for:  C50.412, Z17.0 (ICD-10-CM) - Malignant neoplasm of upper-outer quadrant of left breast in female, estrogen receptor positive (H)     Clinical History (per Nurse review of records provided):      Patient had bilateral screening mammograms on 4/15/24 and a focal asymmetry was identified in the outer left breast at approximately 3:00, 12 to 14 cm from the nipple. Left breast ultrasound followed on 4/23 and the mammographic abnormality corresponds with irregular, hyperechoic, shadowing mass at the 2:00 position 11 cm from the nipple. The mass measures 17 x 16 x 15 mm.      4/23/24 - [id number]  Left breast mass, 17 x 16 x 15 mm mass, 2 o  clock, 11 cm from the nipple, coil clip, ultrasound-guided core biopsy:  --- POSITIVE FOR MALIGNANCY: Invasive ductal carcinoma, characterized by:  --------- A. Lucas stgstrstastdstest:st st1st of 3, Lucas score: 7 of 9.  --------- B. Greatest linear length of invasive carcinoma: 7 mm.  --- Associated ductal carcinoma in situ: Absent.  ------ Ancillary studies:  --------- A. Estrogen receptor status: Positive (Strong nuclear staining in >95% of tumor cells).  --------- B. Progesterone receptor status: Positive (Weak nuclear staining in 30% of tumor cells).  --------- C. HER2 status: Pending, results as an addendum.  --------- D. Ki-67 proliferation index: 22.4% (112 of 500).  --- Her2 by FISH:  NEGATIVE (Unamplified).  ----- Her2:D17Z1 ratio = 1.23.  ----- Average Her2 signals per cell = 3.3.    Patient consulted with Dr. Barfield and Dr. Fairbanks and proceeded with surgery.     5/15/24 - [id number]  A. Lymph node, LEFT axillary, sentinel #1, excision:  -One benign lymph node (0/1)  B. LEFT breast, upper outer quadrant, RFID  localized segmental mastectomy:  -INVASIVE BREAST CARCINOMA OF NO SPECIAL TYPE (INVASIVE DUCTAL CARCINOMA), BYRON  GRADE 2, size 16 mm  -No lymphovascular invasion identified  -Margins are uninvolved by invasive carcinoma  -Invasive carcinoma is 4 mm from the nearest margin (anterior) and > 5 mm from the remaining margins  -Atypical lobular hyperplasia (ALH)  -Other findings: Few microcysts with apocrine metaplasia  -Calcifications associated with invasive carcinoma and benign acini  -Prior core biopsy site changes  -See tumor synoptic below  C. LEFT breast, new inferior margin, excision:  -Benign breast tissue with fibrocystic change (including microcysts)  -Calcifications associated with benign acini  -Negative for atypia or malignancy    An Oncotype was ordered with RS = 10.    5/30 1255 - Telephoned and spoke with Roxana. Explained my role and purpose for the call. She would like to see provider in [place]. Depending upon the number of fractions, she may request second opinion and care in [place], [place].    Writer received referral, reviewed for appropriate plan, and call warm transferred to New Patient Scheduling for completion.    Patient resides in [place], [place].    Records Location: See Bookmarked material

## 2024-05-30 NOTE — PATIENT INSTRUCTIONS
Assessing Cancer Risk  Only about 5-10% of cancers are thought to be due to an inherited cancer susceptibility gene.      These families often have:  Several people with the same or related types of cancer  Cancers diagnosed at a young age (before age 50)  Individuals with more than one primary cancer  Multiple generations of the family affected with cancer    Some people may be candidates for genetic testing of more than one gene.  For these families, genetic testing using a cancer panel may be offered.  These panels will test different genes known to increase the risk for breast, ovarian, uterine, and/or other cancers. All of the genes discussed below have published clinical management guidelines for individuals who are found to carry a mutation.     The purpose of this handout is to serve as a brief summary of the genes analyzed by the panels used to inquire about hereditary breast, gynecologic, and colon cancer:  APC, KEESHA, BMPR1A, BRCA1, BRCA2, BRIP1, CDH1, CHEK2, EPCAM, GREM1, MLH1, MSH2, MSH6, MUTYH, PMS2, POLD1, POLE, PTEN, PALB2, RAD51C, RAD51D, SMAD4, STK11, and TP53. _______________________________________________________________________  Hereditary Breast and Ovarian Cancer Syndrome (BRCA1 and BRCA2)  A single mutation in one of the copies of BRCA1 or BRCA2 increases the risk for breast and ovarian cancer, among others.  The risk for pancreatic cancer and melanoma may also be slightly increased in some families.  The chart below shows the chance that someone with a BRCA mutation would develop cancer in his or her lifetime1,2,3,4.        A person s ethnic background is also important to consider, as individuals of Ashkenazi Yazdanism ancestry have a higher chance of having a BRCA gene mutation.  There are three BRCA mutations that occur more frequently in this population.      Marcial Syndrome (MLH1, MSH2, MSH6, PMS2, and EPCAM)  Currently five genes are known to cause Marcial Syndrome: MLH1, MSH2, MSH6,  PMS2, and EPCAM.  A single mutation in one of the Marcial Syndrome genes increases the risk for colon, endometrial, ovarian, and stomach cancers.  Other cancers that occur less commonly in Marcial Syndrome include urinary tract, skin, and brain cancers.  The chart below shows the chance that a person with Marcial syndrome would develop cancer in his or her lifetime5.      *Cancer risk varies depending on Marcial syndrome gene found      Cowden Syndrome (PTEN)  Cowden syndrome is a hereditary condition that increases the risk for breast, thyroid, endometrial, colon, and kidney cancer.  Cowden syndrome is caused by a mutation in the PTEN gene.  A single mutation in one of the copies of PTEN causes Cowden syndrome and increases cancer risk.  The chart below shows the chance that someone with a PTEN mutation would develop cancer in their lifetime6,7.  Other benign features seen in some individuals with Cowden syndrome include benign skin lesions (facial papules, keratoses, lipomas), learning disability, autism, thyroid nodules, colon polyps, and larger head size.      *One recent study found breast cancer risk to be increased to 85%      Li-Fraumeni Syndrome (TP53)  Li-Fraumeni Syndrome (LFS) is a cancer predisposition syndrome caused by a mutation in the TP53 gene. A single mutation in one of the copies of TP53 increases the risk for multiple cancers. Individuals with LFS are at an increased risk for developing cancer at a young age. The lifetime risk for development of a LFS-associated cancer is 50% by age 30 and 90% by age 60.   Core Cancers: Sarcomas, Breast, Brain, Lung, Leukemias/Lymphomas, Adrenocortical carcinomas  Other Cancers: Gastrointestinal, Thyroid, Skin, Genitourinary      Hereditary Diffuse Gastric Cancer (CDH1)  Currently, one gene is known to cause hereditary diffuse gastric cancer (HDGC): CDH1.  Individuals with HDGC are at increased risk for diffuse gastric cancer and lobular breast cancer. Of people  diagnosed with HDGC, 30-50% have a mutation in the CDH1 gene.  This suggests there are likely other genes that may cause HDGC that have not been identified yet.    Lifetime Cancer Risks    General Population HDGC    Diffuse Gastric  <1% ~80%   Breast 12% 39-52%         Familial Adenomatous Polyposis (FAP)  FAP is a hereditary cancer syndrome caused by mutations in the APC gene. The condition is known to cause hundreds to thousands of adenomatous polyps in the colon, creating a  carpet  of polyps. Some individuals have what is called attenuated FAP (AFAP), a milder form of FAP with fewer polyps and typically later onset. Individuals with an APC gene mutation are at an increased risk for colon, thyroid, and duodenal cancers, as well as several other types of cancer1.  Other features of this condition may include: osteomas, dental anomalies, benign skin lesions, CHRPE ( freckle  on the inside of the eye), and desmoid tumors.      Lifetime Cancer Risks     Cancer Type General Population FAP   Colon  5% near 100%   Thyroid (papillary) 1% 1-12%   Duodenal <1% 5%   Liver  <1% 1-2% before age 5   Pancreas <1% 1%   Stomach <1% 1%       Juvenile Polyposis Syndrome (JPS)  JPS is characterized by hamartomatous polyps, called juvenile polyps, in the gastrointestinal tract.  Juvenile  refers to the type of polyps seen in this hereditary cancer condition, not the age of onset. Currently, mutations in two genes are known to cause JPS: BMPR1A and SMAD4. Of individuals clinically diagnosed with JPS, only 40% have an identifiable mutation in one of these genes, suggesting there are other genes that cause JPS that have not been discovered yet. Individuals with JPS are at an increased risk for colon cancer and stomach cancer 2,3,4. Pancreatic and small bowel cancers have also been reported in JPS, but the actual risks are unknown.       Lifetime Cancer Risks    Cancer Type General Population JPS   Colon 5% 40-50%   Gastric/Duodenal <1%  10-21%     Some individuals with SMAD4 mutations have a condition called JPS/HHT (Juvenile Polyposis/Hereditary Hemorrhagic Telangiectasia) where in addition to JPS, individuals may have nose bleeds and clotting issues.       MUTYH-Associated Polyposis (MAP)  MAP is a hereditary cancer syndrome caused by mutations in the MUTYH gene. Unlike the other hereditary cancer genes discussed in this handout, two mutations in the MUTYH gene cause MAP and increase cancer risk. Those affected with MAP typically have between  adenomatous polyps. This syndrome also increases the risk for colon and duodenal cancer. Current research suggests that other cancers may be associated with MUTYH mutations, as well. The table below includes the risk that someone with two MUTYH gene mutations would develop cancer in their lifetime; of note, there is also an increased colon cancer risk for individuals who carry only one MUTYH gene mutation5,6,7.     Lifetime Cancer Risks    Cancer Type General Population MAP   Colon 5% %   Duodenal  <1% 5%       Peutz-Jeghers syndrome (PJS)  PJS is a hereditary cancer syndrome caused by mutations in the STK11 gene. This condition can be distinguished from other hereditary syndromes by the presence of hamartomatous polyps in the gastrointestinal tract and freckles present in unusual places such as the hands, feet, neck, and lips. Individuals with Peutz-Jeghers syndrome have an increased risk for colon, breast, pancreatic, and other cancers3.  Men are at risk for testicular tumors which can affect hormones in the body. Women are at risk for sex cord tumors of the ovaries and a rare aggressive type of cervical cancer.     Lifetime Cancer Risks    Cancer Type General Population PJS   Breast 12% 45-50%   Colon 5% 39%   Stomach <1% 29%   Pancreas 1.5% 11-36%   Small Intestine <1% 13%     Ovarian  2%  18%   Lung 6-7% 15-17%     Additional Genes Associated with Increased Colon Cancer Risk    CHEK2  CHEK2  is a moderate-risk breast cancer gene.  Women who have a mutation in CHEK2 have around a 2-fold increased risk for breast cancer compared to the general population, and this risk may be higher depending upon family history.12,13,14.  Mutations in CHEK2 have also been shown to increase the risk of a number of other cancers, including colon and prostate, however these cancer risks are currently not well understood.     KEESHA  KEESHA is a moderate-risk breast cancer gene. Women who have a mutation in KEESHA can have between a 2-4 fold increased risk for breast cancer compared to the general population8. KEESHA mutations have also been associated with increased risk for pancreatic cancer, however an estimate of this cancer risk is not well understood9. Individuals who inherit two KEESHA mutations have a condition called ataxia-telangiectasia (AT).  This rare autosomal recessive condition affects the nervous system and immune system, and is associated with progressive cerebellar ataxia beginning in childhood.  Individuals with ataxia-telangiectasia often have a weakened immune system and have an increased risk for childhood cancers.    PALB2  Mutations in PALB2 have been shown to increase the risk of breast cancer up to 33-58% in some families; where individuals fall within this risk range is dependent upon family [id number]. PALB2 mutations have also been associated with increased risk for pancreatic cancer, although this risk has not been quantified yet.  Individuals who inherit two PALB2 mutations--one from their mother and one from their father--have a condition called Fanconi Anemia.  This rare autosomal recessive condition is associated with short stature, developmental delay, bone marrow failure, and increased risk for childhood cancers.    GREM1  GREM1 is a moderate-risk colon polyposis gene. Duplications of this gene are more commonly found in individuals with Ashkenazi Oriental orthodox [id number]. Mutations in GREM1 are associated with  colon polyps and therefore an increased risk of colon cancer; however the estimated cancer risk is not well [id number].     POLD1 and POLE  POLD1 and POLE are moderate-risk colon cancer genes. Carriers of a mutation in one of these genes increases the lifetime risk of colorectal bohbbx31,18,19,20. Mutations in these genes may also be associated with increased risk for other cancers including: endometrial cancer, duodenal adenomas and carcinomas, and brain tumors.    BRIP1, RAD51C and RAD51D  Mutations in BRIP1, RAD51C, and RAD51D have been shown to increase the risk of ovarian cancer and possibly female breast cancer as well14,15 .      Lifetime Cancer Risk    General Population BRIP1 RAD51C RAD51D   Ovarian 1-2% ~5-8% ~5-9% ~7-15%     ______________________________________________________________  Inheritance  All of the cancer syndromes reviewed above are inherited in an autosomal dominant pattern.  This means that if a parent has a mutation, each of his or her children will have a 50% chance of inheriting that same mutation.  Therefore, each child--male or female--would have a 50% chance of being at increased risk for developing cancer.      Image obtained from Genetics Home Reference, 2013     Mutations in some genes can occur de brenda, which means that a person s mutation occurred for the first time in them and was not inherited from a parent.  Now that they have the mutation, however, it can be passed on to future generations.    Genetic Testing  Genetic testing involves a blood test and will look at the genetic information in the APC, KEESHA, BMPR1A, BRCA1, BRCA2, BRIP1, CDH1, CHEK2, EPCAM, GREM1, MLH1, MSH2, MSH6, MUTYH, PMS2, POLD1, POLE, PTEN, PALB2, RAD51C, RAD51D, SMAD4, STK11, and TP53.genes for any harmful mutations that are associated with increased cancer risk.  If possible, it is recommended that the person(s) who has had cancer be tested before other family members.  That person will give us the most  useful information about whether or not a specific gene is associated with the cancer in the family.    Results  There are three possible results of genetic testing:  Positive--a harmful mutation was identified in one or more of the genes  Negative--no mutation was identified in any of the genes on this panel  Variant of unknown significance--a variation in one of the genes was identified, but it is unclear how this impacts cancer risk in the family    Advantages and Disadvantages   There are advantages and disadvantages to genetic testing.    Advantages  May clarify your cancer risk  Can help you make medical decisions  May explain the cancers in your family  May give useful information to your family members (if you share your results)    Disadvantages  Possible negative emotional impact of learning about inherited cancer risk  Uncertainty in interpreting a negative test result in some situations  Possible genetic discrimination concerns (see below)    Genetic Information Nondiscrimination Act (JOSE)  JOSE is a federal law that protects individuals from health insurance or employment discrimination based on a genetic test result alone.  Although rare, there are currently no legal discrimination protections in terms of life insurance, long term care, or disability insurances.  Visit the National Human Audit [employer] [place] website to learn more.    Reducing Cancer Risk  All of the genes described above have nationally recognized cancer screening guidelines that would be recommended for individuals who test positive.  In addition to increased cancer screening, surgeries may be offered or recommended to reduce cancer risk.  Recommendations are based upon an individual s genetic test result as well as their personal and family history of cancer.    Questions to Think About Regarding Genetic Testing:  What effect will the test result have on me and my relationship with my family members if I have an inherited gene  mutation?  If I don t have a gene mutation?  Should I share my test results, and how will my family react to this news, which may also affect them?  Are my children ready to learn new information that may one day affect their own health?    Hereditary Cancer Resources    FORCE: Facing Our Risk of Cancer Empowered facingourrisk.org   Bright Pink bebrightpink.org   Li-Fraumeni Syndrome Association lfsassociation.org   PTEN World PTENworld.GIROPTIC   No stomach for cancer, Inc. nostomachforcancer.org   Stomach cancer relief network Scrnet.org   Collaborative Group of the Americas on Inherited Colorectal Cancer (CGA) cgaicc.com    Cancer Care cancercare.org   American Cancer Society (ACS) cancer.org   National Cancer Nora (NCI) cancer.gov     Please call us if you have any questions or concerns.   Cancer Risk Management Program [phone]ANC[facility] ([phone])  Mukesh López, MS, [facility]  [phone]  Corina Nascimento, MS, [facility]  [phone]  Libby Quintana, MS, [facility]  [phone]  Ana Cristina Bourgeois, MS, [facility]  [phone]  Brittney Morrow, MS, [facility]  [phone]  Brittny Renteria, MS, [facility]  [phone]  Shruti James, MS  [phone]    References  Nain LOPEZ, Refugio PDP, Luann S, Dequan VARGAS, Sol JE, Talib JL, Berny N, Bryn H, Latisha O, Poonam A, Naz B, Elise P, Brittnee S, Dwight DM, Ru N, Deejay E, John H, Alejo E, Jannie J, Gronwald J, Dominik B, Anastasia H, Thorlaci S, Asterrola H, Nica H, Heather K, Yanni OP. Average risks of breast and ovarian cancer associated with BRCA1 or BRCA2 mutations detected in case series unselected for family history: a combined analysis of 222 studies. Am J Hum Stella. 2003;72:1117-30.  Jose WATERS, Melonie HOLLOWAY, Kai CARABALLO.  BRCA1 and BRCA2 Hereditary Breast and Ovarian Cancer. Gene Reviews online. 2013.  Percy YC, Demarco S, Claribel G, Ames S. Breast cancer risk among male BRCA1 and BRCA2 mutation carriers. J Natl Cancer Inst. 2007;99:1811-4.  Deo AGUILERA, Fabio I, John PATTERSON,  Faisal E, Melchor ER, Dionte F. Risk of breast cancer in male BRCA2 carriers. J Med Stella. 2010;47:710-1.  National Comprehensive Cancer Network. Clinical practice guidelines in oncology, colorectal cancer screening. Available online (registration required). 2015.  Fredi MH, Kirk J, Catherine J, Robin LA, Tiffani MS, Sreekanth C. Lifetime cancer risks in individuals with germline PTEN mutations. Clin Cancer Res. 2012;18:400-7.  Collin GARZA. Cowden Syndrome: A Critical Review of the Clinical Literature. J Stella . 2009:18:13-27.  Chilango A, Ky D, Nemo S, Yudi P, Domenic T, Shira M, Cliff B, Sarah H, Griselda R, Irina K, Holley L, Deo DG, Dwight D, Wai DF, Samantha MR, The Breast Cancer Susceptibility Collaboration (UK) & Al WATERS. KEESHA mutations that cause ataxia-telangiectasia are breast cancer susceptibility alleles. Nature Genetics. 2006;38:873-875  Juan F N , Narciso Y, Judith J, Karly L, Kezia GM , Kris ML, Gallinger S, Coronado AG, Syngal S, Markus ML, Yvon J , Tere R, Nasreen SZ, Esgenny JR, Mitzi VE, Fadumo M, Vogelstein B, Rachel N, Seema RH, Gilbert KW, and Acacia AP. KEESHA mutations in patients with hereditary pancreatic cancer. Cancer Discover. 2012;2:41-46  Nain STANFORD., et al. Breast-Cancer Risk in Families with Mutations in PALB2. NEJM. 2014; 371(6):497-506.  CHEK2 Breast Cancer Case-Control Consortium. CHEK2*1100delC and susceptibility to breast cancer: A collaborative analysis involving 10,860 breast cancer cases and 9,065 controls from 10 studies. Am J Hum Stella, 74 (2004), pp. 2375-2373  Axel T, Pasquale S, Monica K, et al. Spectrum of Mutations in BRCA1, BRCA2, CHEK2, and TP53 in Families at High Risk of Breast Cancer. MAXWELL. 2006;295(12):9267-7015.   Todd ANNE, Reyna D, Estela LOPEZ, et al. Risk of breast cancer in women with a CHEK2 mutation with and without a family history of breast cancer. J Clin Oncol. 2011;29:3017-5615.  Michael H, Nayely E, Katie REED, et al.  Contribution of germline mutations in the RAD51B, RAD51C, and RAD51D genes to ovarian cancer in the population. J Clin Oncol. 2015;33(26):1533-5099. Doi:10.1200/JCO.2015.61.2408.  Shailesh MASON, Mallory ARBOLEAD, Naresh P, et al. Mutations in BRIP1 confer high risk of ovarian cancer. Basia Stella. 2011;43(11):3965-3370. doi:10.1038/ng.955.  Cosme BISWAS, Demi PATTERSON, Kvng G, Dane E, Whitley J, et al. The Prevalence of thyroid cancer and benign thyroid disease in patients with familial adenomatous polyposis may be higher than previously recognized. Clin Colorectal Cancer. 2012;11:304-308.  William L, Van Rob A, Javier L, Tina ANNE, Shruthi K, et al. Risk of colorectal cancer in juvenile polyposis. Gut. 2007;56:965-967.  Mcfarland FG, Makenna MN, Mike CA. Colorectal cancer risk in hamartomatous polyposis syndromes. World Journal of Gastrointestinal Surgery. 2015;27:25-32  Derik MG. Guidance on gastrointestinal surveillance for hereditary non-polyposis colorectal cancer, familial adenomatous polypolis, juvenile polyposis, and Peutz-Jeghers syndrome. Gut. 2002;51:21-27.  Massimo AK, Cruz JERRICA, Hieu JG et al. Risk of extracolonic cancers for people with biallelic and monoallelic mutations in MUTYH. Int J of Cancer. 2016;139:2708-5976.  Niraj S, Farhan S, Ciera H, Nikia K, Mann M, et al. MUTYH-associated polyposis: 70 of 71 patients with biallelic mutations present with an attenuated or atypical phenotype. Int J of Cancer. 2006;119:807-814.  Julius CARABALLO, Emelina F, Genaro I, Tito M, Julius H, et al. MUTYH mutation carriers have increased breast cancer risk. Cancer. 2012;6175-1081.  Fredi MH, Kirk J, Catherine J, Robin LA, Tiffani MS, Eng C. Lifetime cancer risks in individuals with germline PTEN mutations. Clin Cancer Res. 2012;18:400-7.  Collin GARZA. Cowden Syndrome: A Critical Review of the Clinical Literature. J Stella . 2009:18:13-27.  National [facility] Cancer Network. Clinical practice  guidelines in oncology, colorectal cancer screening. Available online (registration required). 2013.  National Cancer [place]. SEER Cancer Stat Fact Sheets.  December 2013.  CHEK2 Breast Cancer Case-Control Consortium. CHEK2*1100delC and susceptibility to breast cancer: A collaborative analysis involving 10,860 breast cancer cases and 9,065 controls from 10 studies. Am J Hum Stella, 74 (2004), pp. 4463-0784  Axel T, Pasquale S, Monica K, et al. Spectrum of Mutations in BRCA1, BRCA2, CHEK2, and TP53 in Families at High Risk of Breast Cancer. MAXWELL. 2006;295(12):8988-2608.  Todd C, Reyna D, Estela A, et al. Risk of breast cancer in women with a CHEK2 mutation with and without a family history of breast cancer. J Clin Oncol. 2011;29:7628-2819.  Bryanna PATTERSON, Alia E, Jason J, Cuate N, Ashley HOLLOWAY et al. Defining the polyposis/colorectal cancer phenotype associated with the GREM1 duplication: counseling and management guidelines. Stella .Res. 2016;98:1-5.  Lonnie E, Yung S, Lawson A, Abebe Souza, et al. Hereditary mixed polyposis syndrome is caused by a 40kb upstream duplication that leads to increased and ectopic expression of the BMP antagonist GREM1. Basia Stella. 2015;44:699-703.  OMID Rios. et al. Germline mutations affecting the proofreading domains of POLE and POLD1 predispose to colorectal adenomas and carcinomas. Basia. Stella. 45, 136-44 (2013).  LAURA Shane. et al. POLE and POLD1 mutations in 529 gokul with familial colorectal cancer and/or polyposis: review of reported cases and recommendations for genetic testing and surveillance. Stella. Med. (2015). doi:10.1038/gim.2015.75  DANIA Teague. et al. New insights into POLE and POLD1 germline mutations in familial colorectal cancer and polyposis. Hum. Mol. Stella. 23, 8969-95 (2014).  PHOENIX Kelly. et al. Frequency and phenotypic spectrum of germline mutations in POLE and seven other polymerase genes in 266 patients with colorectal adenomas and carcinomas. Int.  J. Cancer 137, 320-31 (2015).

## 2024-05-30 NOTE — NURSING NOTE
Is the patient currently in the state of [place]? YES    Visit mode:VIDEO    If the visit is dropped, the patient can be reconnected by: VIDEO VISIT: Send to e-mail at: [email or web address]    Will anyone else be joining the visit? NO  (If patient encounters technical issues they should call [id number]    How would you like to obtain your AVS? MyChart    Are changes needed to the allergy or medication list? N/A    Reason for visit: Consult      Yun ORTIZ

## 2024-05-30 NOTE — LETTER
Cancer Risk Management  Program Locations    [facility]  [facility]  [facility]  [facility]  [facility]  Mailing Address  Cancer Risk Management Program  [facility]  [address]  [place] [address]    New patient appointments  [phone]    May 30, 2024    Roxana Card  [address] [address]    Dear Roxana,    It was a pleasure talking with you via your virtual genetic counseling visit on 5/30/2024.  Below is a copy of the progress note from that recent visit through the Cancer Risk Management Program.  If you have any additional questions, please feel free to contact me.      Referring Provider: America Fairbanks MD     Presenting Information:   I met with Roxana Card today, along with her  Hua, for her video genetic counseling visit through the Cancer Risk Management Program to discuss her personal history of breast cancer and family history of colon cancer. She is here today to review this history, cancer screening recommendations, and available genetic testing options.    Personal History:  Roxana is a 73 year old female. She was recently diagnosed with breast cancer. On 4/15/2024, a screening mammogram found focal asymmetry in the left breast. An ultrasound on 4/23/2024 showed an irregular, hyperechoic, shadowing mass. A biopsy revealed invasive ductal carcinoma (ER/IL +, HER2 -). Roxana underwent a lumpectomy on 5/15/2024. She has already had some genetic testing ordered by Dr. Barfield via the Breast Actionable Panel offered by the Molecular Diagnostics Laboratory at [facility]. Results are currently pending. We reviewed this panel in detail today.    In her hormonal-based medical history, she had her first menstrual period at age 12, her first child at age 20, and underwent menopause at age 55. Roxana underwent a  hysterectomy in 2007. She still has her ovaries and fallopian tubes in place, and reports no ovarian cancer screening to date. She reports no history of hormone replacement therapy. Roxana reports oral contraceptive use for approximately 4 years.       Roxana began having colonoscopies at the age of 45. Her most recent colonoscopy in October 2021 was normal and follow-up was recommended in 5 years. Roxana reports a history of dermatological exams. She does not regularly do any other cancer screening at this time. Roxana reported no history of nicotine or tobacco use and occasional alcohol use.    Family History: (Please see scanned pedigree for detailed family history information)  Both of Roxana's brothers have been found to have some colon polyps. One brother is currently 79, while the other passed away at age 68.   Roxana's father was diagnosed with colon cancer in his late 60's/early 70's. He passed away at age 71.  Paternal aunt was diagnosed with colon cancer in her 70's and passed away shortly after.  Five paternal uncles were diagnosed with colon cancer over the age of 50. They have all passed away.  Another paternal uncle was diagnosed with lung cancer at age 54 and passed away shortly after. It was reported that he was a smoker.   Five male paternal first cousins (none of which were siblings to each other) were diagnosed with colon cancer in their 50's/60's. All have since passed away.   Of note, there is no history of cancer on her maternal side of her family.   Her maternal ethnicity is [place]. Her paternal ethnicity is [place].  There is no known Ashkenazi Voodoo ancestry on either side of her family. There is no reported consanguinity.    Discussion:  Roxana's personal history of breast cancer and family history of colon cancer is suggestive of a hereditary cancer syndrome.  We briefly reviewed basic genetics principles. Many of the genes we are born with impact our risk of certain diseases, such as  cancer.  When one of these genes is not working properly due to a mistake (known as a  mutation  or  variant ), this may lead to an increased risk of developing cancer.   We reviewed the features of sporadic, familial, and hereditary cancers.   Cancer is a common diagnosis which impacts many families. The vast majority of cancers are considered sporadic and not primarily due to an inherited factor. Individuals can develop cancer due to aging, chance events, environmental exposures or lifestyles.  Only ~5-10% of cancer diagnoses are thought to be caused by inheriting a mutation or variant within a single cancer susceptibility gene. Features typically seen in these high risk families include: cancers diagnosed under age 50, multiple relatives with similar cancers on the same side of the family, cancers in multiple generations, and relatives with certain rare cancers (i.e., male breast cancer).   We discussed the natural history and genetics of several hereditary cancer syndromes, including Hereditary Breast and Ovarian Cancer Syndrome (HBOC) and Marcial syndrome.  The most common cause of hereditary breast and ovarian cancer is HBOC, which is caused by mutations in the BRCA1 and BRCA2 genes. Individuals with HBOC syndrome are at increased risk for several different cancers including: female and make breast, ovarian cancer, prostate cancer, melanoma, and pancreatic cancer. HBOC typically presents with multiple family members diagnosed with breast cancer before age 50 and/or ovarian cancer.   Marcial syndrome can be caused by a mutation in one of five genes:  MLH1, MSH2, MSH6, PMS2, and EPCAM. A single mutation in one of the Marcial Syndrome genes increases the risk for several cancers, such as colon cancer, endometrial/uterine cancer, gastric cancer, and ovarian cancer. Other cancers have also been reported in some families with Marcial Syndrome include pancreatic, bladder, biliary tract, urothelial, small bowel, prostate,  breast and brain cancers.  A detailed handout regarding HBOC, Marcial syndrome, and the information we discussed will be provided to Roxana via [employer] and can be found in the after visit summary. Topics included: inheritance pattern, cancer risks, cancer screening recommendations, and also risks, benefits and limitations of testing.  In looking at Roxana's personal and family history, it is possible that a cancer susceptibility gene is present due to her father diagnosed with colon cancer in his late 60's/early 70's, five paternal uncles diagnosed with colon cancer, a paternal aunt diagnosed with colon cancer in her 70's, and five paternal first cousins diagnosed with colon cancer in their 50's/60's.  Based on her personal and family history, Roxana meets current National Comprehensive Cancer Network (NCCN) criteria for genetic testing of Marcial syndrome genes (i.e. EPCAM, MLH1, MSH2, MSH6, and PMS2).   In looking at Roxana's personal and family history, it is possible that a cancer susceptibility gene is present due to her personal history of breast cancer at age 73.  Roxana qualifies for genetic testing due to her breast cancer diagnoses. The American Society of Breast Surgeons Consensus Guideline on Genetic Testing for Hereditary Breast Cancer state that genetic testing should be available to all patients who have been diagnosed with breast cancer. The guidelines state that testing should include BRCA1, BRCA2, and PALB2, and that additional testing may be warranted based on personal and/or family history.   As discussed above, the Breast Actionable Panel, ordered by Dr. Barfield,  is currently pending. This panel consists of 11 genes associated with high or moderate risk for breast cancer: KEESHA, BARD1, BRCA1, BRCA2, CDH1, CHEK2, NF1, PALB2, PTEN, STK11, TP53. We reviewed the genes on this panel, and discussed that there are additional genes that could cause increased risk for breast cancer that weren't included in her  testing, it is possible that a mutation could be identified in Roxana in one of these other breast cancer associated genes. Therefore, it would be appropriate for Roxana to consider more comprehensive genetic testing related to genes associated with breast cancer to better understand her risk for hereditary cancer.  We reviewed additional genetic testing options available for Roxana based on her personal and family history: a panel of genes associated with an increased risk for hereditary breast, gynecologic, and colon cancers, or larger panel options to include genes associated with increased risk for multiple different cancer types. Roxana expressed interest in the Marcial syndrome genes with automatic reflex to the Hereditary Cancer Comprehensive Expanded panel through [facility].   Roxana's blood has already been drawn for testing and is currently at the lab. Verbal consent was given over video and written on the consent form. Turnaround time for the additional testing ordered today is approximately 4 weeks.  We discussed that someone from Dr. Barfield's team will contact Roxana with the results of the Breast Actionable panel once they are complete. I will contact Roxana with the results of the Hereditary Cancer Comprehensive Expanded panel once they are complete.   Medical Management: For Roxana, we reviewed that the information from genetic testing may determine:  additional cancer screening for which Roxana may qualify (i.e. mammogram and breast MRI, more frequent colonoscopies, more frequent dermatologic exams, etc.),  options for risk reducing surgeries Roxana could consider (i.e. bilateral mastectomy, surgery to remove her ovaries and/or uterus, etc.),    and targeted chemotherapies if she were to develop certain cancers in the future (i.e. immunotherapy for individuals with Marcial syndrome, PARP inhibitors, etc.).   These recommendations and possible targeted  chemotherapies will be discussed in detail once genetic testing is completed.     Plan:  1) Today Roxana elected to proceed with the Marcial syndrome genes with automatic reflex to the Hereditary Cancer Comprehensive Expanded panel through [facility]. Therefore, consent was reviewed and verbally obtained for this testing.  2) Her blood has already been drawn and is currently at the lab.    3) The results of the additional testing ordered today should be available in 4 weeks.  4) We discussed that someone from Dr. Barfield's team will contact Roxana with the results of the Breast Actionable panel once they are complete.   5) Roxana will either have a telephone visit or video visit with me to discuss the results of the Hereditary Cancer Comprehensive Expanded panel. Additional recommendations about screening will be made at that time.    Roxana is 73 year old and is being evaluated via a billable video visit.    Time spent on video: 33 minutes    Shruti James MS, [facility]  Licensed, Certified Genetic Counselor  Phone: [phone]

## 2024-05-30 NOTE — LETTER
Cancer Risk Management  Program Locations    [facility]  [facility]  [facility]  [facility]  [facility]  Mailing Address  Cancer Risk Management Program  [facility]  [address]  [place] [address]    New patient appointments  [phone]    May 30, 2024    Roxana Card  [address] [address]    Dear Roxana,    It was a pleasure talking with you via your virtual genetic counseling visit on 5/30/2024.  Below is a copy of the progress note from that recent visit through the Cancer Risk Management Program.  If you have any additional questions, please feel free to contact me.    Referring Provider: Dame Carolyne MD    Presenting Information:   I met with Roxana Emerynson today, along with her  Hua, for her video genetic counseling visit through the Cancer Risk Management Program to discuss her personal history of breast cancer and family history of colon cancer. She is here today to review this history, cancer screening recommendations, and available genetic testing options.    Personal History:  Roxana is a 73 year old female. She was recently diagnosed with breast cancer. On 4/15/2024, a screening mammogram found focal asymmetry in the left breast. An ultrasound on 4/23/2024 showed an irregular, hyperechoic, shadowing mass. A biopsy revealed invasive ductal carcinoma (ER/PA +, HER2 -). Roxana underwent a lumpectomy on 5/15/2024. She has already had some genetic testing ordered by Dr. Barfield via the Breast Actionable Panel offered by the Molecular Diagnostics Laboratory at [facility]. We reviewed this panel in detail today.    In her hormonal-based medical history, she had her first menstrual period at age 12, her first child at age 20, and underwent menopause at age 55. Roxana underwent a hysterectomy in 2007. She still has her  ovaries and fallopian tubes in place, and reports no ovarian cancer screening to date. She reports no history of hormone replacement therapy. Roxana reports oral contraceptive use for approximately 4 years.       Roxana began having colonoscopies at the age of 45. Her most recent colonoscopy in October 2021 was normal and follow-up was recommended in 5 years. Roxana reports a history of dermatological exams. She does not regularly do any other cancer screening at this time. Roxana reported no history of nicotine or tobacco use and occasional alcohol use.    Family History: (Please see scanned pedigree for detailed family history information)  Both of Roxana's brothers have been found to have some colon polyps. One brother is currently 79, while the other passed away at age 68.   Roxana's father was diagnosed with colon cancer in his late 60's/early 70's. He passed away at age 71.  Paternal aunt was diagnosed with colon cancer in her 70's and passed away shortly after.  Five paternal uncles were diagnosed with colon cancer over the age of 50. They have all passed away.  Another paternal uncle was diagnosed with lung cancer at age 54 and passed away shortly after. It was reported that he was a smoker.   Five male paternal first cousins (none of which were siblings to each other) were diagnosed with colon cancer in their 50's/60's. All have since passed away.   Of note, there is no history of cancer on her maternal side of her family.   Her maternal ethnicity is [place]. Her paternal ethnicity is [place]ian.  There is no known Ashkenazi Zoroastrianism ancestry on either side of her family. There is no reported consanguinity.    Discussion:  Roxana's personal history of breast cancer and family history of colon cancer is suggestive of a hereditary cancer syndrome.  We briefly reviewed basic genetics principles. Many of the genes we are born with impact our risk of certain diseases, such as cancer.  When one of these genes is not  working properly due to a mistake (known as a  mutation  or  variant ), this may lead to an increased risk of developing cancer.   We reviewed the features of sporadic, familial, and hereditary cancers.   Cancer is a common diagnosis which impacts many families. The vast majority of cancers are considered sporadic and not primarily due to an inherited factor. Individuals can develop cancer due to aging, chance events, environmental exposures or lifestyles.  Only ~5-10% of cancer diagnoses are thought to be caused by inheriting a mutation or variant within a single cancer susceptibility gene. Features typically seen in these high risk families include: cancers diagnosed under age 50, multiple relatives with similar cancers on the same side of the family, cancers in multiple generations, and relatives with certain rare cancers (i.e., male breast cancer).   We discussed the natural history and genetics of several hereditary cancer syndromes, including Hereditary Breast and Ovarian Cancer Syndrome (HBOC) and Marcial syndrome.  The most common cause of hereditary breast and ovarian cancer is HBOC, which is caused by mutations in the BRCA1 and BRCA2 genes. Individuals with HBOC syndrome are at increased risk for several different cancers including: female and make breast, ovarian cancer, prostate cancer, melanoma, and pancreatic cancer. HBOC typically presents with multiple family members diagnosed with breast cancer before age 50 and/or ovarian cancer.   Marcial syndrome can be caused by a mutation in one of five genes:  MLH1, MSH2, MSH6, PMS2, and EPCAM. A single mutation in one of the Marcial Syndrome genes increases the risk for several cancers, such as colon cancer, endometrial/uterine cancer, gastric cancer, and ovarian cancer. Other cancers have also been reported in some families with Marcial Syndrome include pancreatic, bladder, biliary tract, urothelial, small bowel, prostate, breast and brain cancers.  A detailed  handout regarding HBOC, Marcial syndrome, and the information we discussed will be provided to Roxana via [employer] and can be found in the after visit summary. Topics included: inheritance pattern, cancer risks, cancer screening recommendations, and also risks, benefits and limitations of testing.  In looking at Roxana's personal and family history, it is possible that a cancer susceptibility gene is present due to her father diagnosed with colon cancer in his late 60's/early 70's, five paternal uncles diagnosed with colon cancer, a paternal aunt diagnosed with colon cancer in her 70's, and five paternal first cousins diagnosed with colon cancer in their 50's/60's.  Based on her personal and family history, Roxana meets current National Comprehensive Cancer Network (NCCN) criteria for genetic testing of Marcial syndrome genes (i.e. EPCAM, MLH1, MSH2, MSH6, and PMS2).   In looking at Roxana's personal and family history, it is possible that a cancer susceptibility gene is present due to her personal history of breast cancer at age 73.  Roxana qualifies for genetic testing due to her breast cancer diagnoses. The American Society of Breast Surgeons Consensus Guideline on Genetic Testing for Hereditary Breast Cancer state that genetic testing should be available to all patients who have been diagnosed with breast cancer. The guidelines state that testing should include BRCA1, BRCA2, and PALB2, and that additional testing may be warranted based on personal and/or family history.   We discussed the Breast Actionable Panel, ordered by Dr. Barfield. This panel consists of 11 genes associated with high or moderate risk for breast cancer: KEESHA, BARD1, BRCA1, BRCA2, CDH1, CHEK2, NF1, PALB2, PTEN, STK11, TP53. We reviewed the genes on this panel, and discussed that there are additional genes that could cause increased risk for breast cancer that weren't included in her testing, it is possible that a mutation could be identified in  Roxana in one of these other breast cancer associated genes. Therefore, it would be appropriate for Roxana to consider more comprehensive genetic testing related to genes associated with breast cancer to better understand her risk for hereditary cancer.  We reviewed additional genetic testing options available for Roxana based on her personal and family history: a panel of genes associated with an increased risk for hereditary breast, gynecologic, and colon cancers, or larger panel options to include genes associated with increased risk for multiple different cancer types. Roxana expressed interest in the Marcial syndrome genes with automatic reflex to the Hereditary Cancer Comprehensive Expanded panel through [facility].   Roxana will need to submit a blood sample for her genetic testing. She will go to her nearest [facility] clinic at her earliest convenience to get her blood drawn for her genetic testing. Verbal consent was given over video and written on the consent form. Turnaround time for the additional testing ordered today is approximately 4 weeks.  We discussed that someone from Dr. Barfield's team will contact Roxana with the results of the Breast Actionable panel once they are complete. I will contact Roxana with the results of the Hereditary Cancer Comprehensive Expanded panel once they are complete.   Medical Management: For Roxana, we reviewed that the information from genetic testing may determine:  additional cancer screening for which Roxana may qualify (i.e. mammogram and breast MRI, more frequent colonoscopies, more frequent dermatologic exams, etc.),  options for risk reducing surgeries Roxana could consider (i.e. bilateral mastectomy, surgery to remove her ovaries and/or uterus, etc.),    and targeted chemotherapies if she were to develop certain cancers in the future (i.e. immunotherapy for individuals with Marcial syndrome, PARP inhibitors, etc.).    These recommendations and possible targeted chemotherapies will be discussed in detail once genetic testing is completed.     Plan:  1) Today Roxana elected to proceed with the Marcial syndrome genes with automatic reflex to the Hereditary Cancer Comprehensive Expanded panel through [facility]. Therefore, consent was reviewed and verbally obtained for this testing.  2) Roxana plans to schedule her blood draw appointment at a clinic that is convenient to her.  3) The results of the additional testing ordered today should be available in 4 weeks.  4) We discussed that someone from Dr. Barfield's team will contact Roxana with the results of the Breast Actionable panel once they are complete.   5) Roxana will either have a telephone visit or video visit with me to discuss the results of the Hereditary Cancer Comprehensive Expanded panel. Additional recommendations about screening will be made at that time.    Roxana is 73 year old and is being evaluated via a billable video visit.    Time spent on video: 33 minutes    ADDENDUM 6/5/2024   Despite having a genetic counseling note for the prior authorization, Roxana's insurance will not cover her genetic testing. Therefore, her team has cancelled her genetic testing through [facility].  I reached out to Roxana to discuss alternative patient-pay genetic testing options with a different laboratory, but she was unavailable. I left my contact information for her to reach out.     Shruti James MS, [facility]  Licensed, Certified Genetic Counselor  Phone: [phone]

## 2024-06-03 ENCOUNTER — APPOINTMENT (OUTPATIENT)
Dept: LAB | Facility: CLINIC | Age: 74
End: 2024-06-03
Attending: INTERNAL MEDICINE
Payer: COMMERCIAL

## 2024-06-03 ENCOUNTER — ONCOLOGY VISIT (OUTPATIENT)
Dept: ONCOLOGY | Facility: CLINIC | Age: 74
End: 2024-06-03
Attending: INTERNAL MEDICINE
Payer: COMMERCIAL

## 2024-06-03 VITALS
OXYGEN SATURATION: 98 % | BODY MASS INDEX: 34.29 KG/M2 | TEMPERATURE: 98.6 F | SYSTOLIC BLOOD PRESSURE: 135 MMHG | RESPIRATION RATE: 16 BRPM | DIASTOLIC BLOOD PRESSURE: 86 MMHG | WEIGHT: 175.1 LBS | HEART RATE: 83 BPM

## 2024-06-03 DIAGNOSIS — C50.412 MALIGNANT NEOPLASM OF UPPER-OUTER QUADRANT OF LEFT BREAST IN FEMALE, ESTROGEN RECEPTOR POSITIVE (H): Primary | ICD-10-CM

## 2024-06-03 DIAGNOSIS — Z80.0 FAMILY HISTORY OF COLON CANCER: ICD-10-CM

## 2024-06-03 DIAGNOSIS — Z17.0 MALIGNANT NEOPLASM OF UPPER-OUTER QUADRANT OF LEFT BREAST IN FEMALE, ESTROGEN RECEPTOR POSITIVE (H): Primary | ICD-10-CM

## 2024-06-03 PROCEDURE — 99214 OFFICE O/P EST MOD 30 MIN: CPT | Mod: 24 | Performed by: INTERNAL MEDICINE

## 2024-06-03 PROCEDURE — G0463 HOSPITAL OUTPT CLINIC VISIT: HCPCS | Performed by: INTERNAL MEDICINE

## 2024-06-03 RX ORDER — LETROZOLE 2.5 MG/1
2.5 TABLET, FILM COATED ORAL DAILY
Qty: 30 TABLET | Refills: 11 | Status: SHIPPED | OUTPATIENT
Start: 2024-06-03

## 2024-06-03 ASSESSMENT — PAIN SCALES - GENERAL: PAINLEVEL: NO PAIN (0)

## 2024-06-03 NOTE — NURSING NOTE
Chief Complaint   Patient presents with    Blood Draw     Vitals and patient checked into appt.      Patient did not have orders, sent Secure chat to provider. Provider states patient does not need labs today.     ELLA Pitts LPN

## 2024-06-03 NOTE — NURSING NOTE
"Oncology Rooming Note    Maria Teresa 3, 2024 3:47 PM   Roxana Card is a 73 year old female who presents for:    Chief Complaint   Patient presents with    Blood Draw     Vitals and patient checked into appt.     Oncology Clinic Visit     Malignant neoplasm of upper-outer quadrant of left breast in female, estrogen receptor positive      Initial Vitals: /86   Pulse 83   Temp 98.6  F (37  C) (Oral)   Resp 16   Wt 79.4 kg (175 lb 1.6 oz)   SpO2 98%   BMI 34.29 kg/m   Estimated body mass index is 34.29 kg/m  as calculated from the following:    Height as of 5/6/24: 1.522 m (4' 11.92\").    Weight as of this encounter: 79.4 kg (175 lb 1.6 oz). Body surface area is 1.83 meters squared.  No Pain (0) Comment: Data Unavailable   No LMP recorded. Patient has had a hysterectomy.  Allergies reviewed: Yes  Medications reviewed: Yes    Medications: Medication refills not needed today.  Pharmacy name entered into [facility]: [id number] [place] [id number] [address]    Frailty Screening:   Is the patient here for a new oncology consult visit in cancer care? 2. No      Clinical concerns: no other complaints      Gilbert Diaz"

## 2024-06-03 NOTE — LETTER
6/3/2024         RE: Roxana Card  [address] [facility] [address]        Dear Colleague,    Thank you for referring your patient, Roxana Card, to the [facility]. Please see a copy of my visit note below.    Medical Oncology Note      Roxana Card    Age: 73 year old        CC: Breast Cancer      HPI: Roxana Card is a 73 year old postmenopausal woman with recent diagnosis of screen detected cT1cN0 HR+/HER2 negative IDC of the left breast.  She underwent a left partial mastectomy and SLNB on 5/15/2024 with evidence of pT2 N0 disease.  Oncotype DX was sent on that specimen and resulted as 10.  She presents today for follow up.       Interval History  Since her surgery, she is overall feeling well and recovering.  She has returned to biking and playing pickle ball shortly after her surgery.  She denies any systemic issues including pain, headaches, respiratory or cardiac issues, nausea, vomiting, or any other acute neurological issues.    She feels well and has no concerns today.    She does not remember having blood testing obtained for germline genetic testing and wonders about this today.        Her full oncologic history is as follows:   Oncologic History  Patient Active Problem List    Diagnosis Date Noted    Malignant neoplasm of upper-outer quadrant of left breast in female, estrogen receptor positive (H) 05/01/2024     Priority: High     Left breast cancer  3/20/2023 last normal screening mammo    4/15/2024 screening mammo showing focal asymmetry in the outer left breast at 3:00, 12-14 cm FN    4/23/2024 left breast ultrasound showing a 1.7 x 1.6 x 1.5 cm mass at 2:00, 11 cm FN, corresponding to the mammographic abnormality.  US guided biopsy demonstrates a grade 2 IDC.  ER (3+,> 95%), AZ (1+, 30%), HER2 2+ negative by FISH, Ki-67 22%    5/6/2024 Contrast enhanced mammo  left breast demonstrates 1.8 cm of enhancement. No other enhancements     5/6/2024 US  of left axillary LN normal     5/15/2024 left partial mastectomy and SLNB showing 1.6 cm of grade 2 IDC.  No LVI.  Negative margins.  ALH.  pT1cN0.  Biomarkers not repeated.  Oncotype Dx 10    6/3/2024 Start Letrozole         Coronary artery disease involving native coronary artery of native heart without angina pectoris 05/06/2024     Priority: Medium    GERD (gastroesophageal reflux disease) 05/06/2024     Priority: Medium    Mixed hyperlipidemia 05/06/2024     Priority: Medium    Primary hypertension 05/06/2024     Priority: Medium    Vitamin D deficiency 05/06/2024     Priority: Medium    Recurrent UTI 09/20/2023     Priority: Medium    Stage 3a chronic kidney disease (H) 03/17/2022     Priority: Medium    Other microscopic colitis 11/23/2021     Priority: Medium    Bilateral primary osteoarthritis of knee 04/28/2021     Priority: Medium    Age-related osteoporosis without current pathological fracture 04/20/2021     Priority: Medium    Mild intermittent asthma 07/09/2019     Priority: Medium            Current Medications:  Current Outpatient Medications   Medication Sig Dispense Refill    acetaminophen (TYLENOL) 650 MG CR tablet Take 1,300 mg by mouth      albuterol (PROAIR HFA/PROVENTIL HFA/VENTOLIN HFA) 108 (90 Base) MCG/ACT inhaler Inhale 2 puffs into the lungs      ASPIRIN LOW DOSE 81 MG chewable tablet CHEW AND SWALLOW 1 TAB IN THE MORNING AND 1 TAB IN THE EVENING FOR BLOOD CLOT PREVENTION.      azelastine (ASTELIN) 0.1 % nasal spray Spray 2 sprays in nostril      Calcium Polycarbophil (FIBER) 625 MG tablet Take 1,250 mg by mouth      cetirizine (ZYRTEC) 10 MG tablet Take 10 mg by mouth      clindamycin (CLEOCIN) 150 MG capsule Take 4 Capsules 1 hour prior to dental appointment. (Patient not taking: Reported on 5/29/2024)      diltiazem ER (CARDIZEM SR) 60 MG 12 hr capsule Take 60 mg by mouth      Multiple Vitamin (MULTI-DAY VITAMINS PO) Take 1 tablet by mouth daily      nitroGLYcerin (NITROSTAT) 0.4 MG  "sublingual tablet Place 0.4 mg under the tongue      pantoprazole (PROTONIX) 40 MG EC tablet TAKE ONE TABLET BY MOUTH EVERY MORNING 30 MINUTES BEFORE BREAKFAST      REPATHA SURECLICK 140 MG/ML prefilled autoinjector Inject 140 mg Subcutaneous      triamcinolone (KENALOG) 0.1 % external cream Apply to the affected area(s) if needed in the morning, at noon, and before bedtime. External cream; APPLY A SMALL AMOUNT 3 TIMES DAILY AS DIRECTED (Patient not taking: Reported on 5/29/2024)      vitamin D3 (CHOLECALCIFEROL) 125 MCG (5000 UT) tablet Take 5,000 Units by mouth           ECOG Performance Status: 0    Physical Examination:  /86   Pulse 83   Temp 98.6  F (37  C) (Oral)   Resp 16   Wt 79.4 kg (175 lb 1.6 oz)   SpO2 98%   BMI 34.29 kg/m    General:  Well appearing, well-nourished adult female in NAD.  HEENT:  Normocephalic.  Sclera anicteric.  MMM.  No lesions of the oropharynx.  Breast: Status post left partial mastectomy and SLNB.  Incisions for sentinel lymph node and partial mastectomy are both healing well.  Lymph:  No cervical, supraclavicular, or axillary LAD.  Chest:  CTA bilaterally.  No wheezes or crackles.  CV:  RRR.  No murmurs or gallops  Abd:  Soft/NT/ND.  BS normoactive.  No hepatosplenomegaly.  Ext:  No pitting edema of the bilateral lower extremities.  Pulses 2+ and symmetric.  Musculo:  Strength 5/5 throughout.  Neuro:  Cranial nerves grossly intact.  Psych:  Mood and affect appear normal.    Laboratory Data:  No results found for: \"WBC\", \"HGB\", \"HCT\", \"MCV\", \"PLT\"  No results found for: \"NA\", \"HCO3\", \"CREATININE\", \"BUN\", \"ANIONGAP\", \"GLUCOSE\"  No results found for: \"ALT\", \"AST\", \"GGT\", \"ALKPHOS\", \"BILITOT\"  No results found for: \"INR\", \"PT\"      Radiology data:  I have personally reviewed the images of / or the following radiology data: As detailed in the oncology timeline      Pathology and other data:  I have personally reviewed the following data: As detailed in the oncology " timeline    5/15/2024  A. Lymph node, LEFT axillary, sentinel #1, excision:  -One benign lymph node (0/1)     B. LEFT breast, upper outer quadrant, RFID localized segmental mastectomy:  -INVASIVE BREAST CARCINOMA OF NO SPECIAL TYPE (INVASIVE DUCTAL CARCINOMA), BYRON GRADE 2, size 16 mm  -No lymphovascular invasion identified  -Margins are uninvolved by invasive carcinoma  -Invasive carcinoma is 4 mm from the nearest margin (anterior) and > 5 mm from the remaining margins  -Atypical lobular hyperplasia (ALH)  -Other findings: Few microcysts with apocrine metaplasia  -Calcifications associated with invasive carcinoma and benign acini  -Prior core biopsy site changes  -See tumor synoptic below     C. LEFT breast, new inferior margin, excision:  -Benign breast tissue with fibrocystic change (including microcysts)  -Calcifications associated with benign acini  -Negative for atypia or malignancy     INVASIVE CARCINOMA OF THE BREAST: Resection   8th Edition - Protocol posted: 9/20/2023INVASIVE CARCINOMA OF THE BREAST: RESECTION - All Specimens  SPECIMEN   Procedure  Excision (less than total mastectomy)   Specimen Laterality  Left   TUMOR   Tumor Site  Upper outer quadrant   Histologic Type  Invasive carcinoma of no special type (ductal)   Histologic Grade (Byron Histologic Score)     Glandular (Acinar) / Tubular Differentiation  Score 2   Nuclear Pleomorphism  Score 3   Mitotic Rate  Score 2   Overall Grade  Grade 2 (scores of 6 or 7)   Tumor Size  Greatest dimension of largest invasive focus (Millimeters): 16 mm   Additional Dimension (Millimeters)  15 mm     12 mm   Tumor Focality  Single focus of invasive carcinoma   Ductal Carcinoma In Situ (DCIS)  Not identified   Lobular Carcinoma In Situ (LCIS)  Not identified   Lymphatic and / or Vascular Invasion  Not identified   Dermal Lymphatic and / or Vascular Invasion  No skin present   Microcalcifications  Present in invasive carcinoma     Present in  non-neoplastic tissue   Treatment Effect in the Breast  No known presurgical therapy   MARGINS   Margin Status for Invasive Carcinoma  All margins negative for invasive carcinoma   Distance from Invasive Carcinoma to Closest Margin  4 mm   Closest Margin(s) to Invasive Carcinoma  Anterior   Distance from Invasive Carcinoma to Posterior Margin  Greater than: 5 mm   Distance from Invasive Carcinoma to Superior Margin  Greater than: 5 mm   Distance from Invasive Carcinoma to Inferior Margin  Greater than: 5 mm   Distance from Invasive Carcinoma to Medial Margin  Greater than: 5 mm   Distance from Invasive Carcinoma to Lateral Margin  Greater than: 5 mm   REGIONAL LYMPH NODES   Regional Lymph Node Status  All regional lymph nodes negative for tumor   Total Number of Lymph Nodes Examined (sentinel and non-sentinel)  1   Number of [place] Nodes Examined  1   pTNM CLASSIFICATION (AJCC 8th Edition)   Reporting of pT, pN, and (when applicable) pM categories is based on information available to the pathologist at the time the report is issued. As per the AJCC (Chapter 1, 8th Ed.) it is the managing physician s responsibility to establish the final pathologic stage based upon all pertinent information, including but potentially not limited to this pathology report.   pT Category  pT1c   pN Category  pN0   N Suffix  (sn)   Breast Biomarker Testing Performed on Previous Biopsy     Estrogen Receptor (ER) Status  Positive (greater than 10% of cells demonstrate nuclear positivity)   Percentage of Cells with Nuclear Positivity  %   Breast Biomarker Testing Performed on Previous Biopsy     Progesterone Receptor (PgR) Status  Positive   Percentage of Cells with Nuclear Positivity  30 %   Breast Biomarker Testing Performed on Previous Biopsy     HER2 (by immunohistochemistry)  Equivocal (Score 2+)   Percentage of Cells with Uniform Intense Complete Membrane Staining  0 %   Breast Biomarker Testing Performed on Previous Biopsy      Ki-67 Percentage of Positive Nuclei  22 %   Testing Performed on Case Number  [id number]   Comment(s)  Best block for ancillary testing: B12   .       Assessment and Recommendations  Roxana Card  is a 73 year old postmenopausal  woman with a left breast pT1cN0 HR+/HER2 negative IDC of the left breast.    # Left breast cancer  - No adjuvant chemotherapy in light of low Oncotype score  - Will add on an appointment to obtain germline genetic testing if this is not in lab already  - Recommend discussions regarding adjuvant radiation therapy with Rad Onc  - Recommend adjuvant endocrine therapy -we discussed aromatase inhibitors in light of her postmenopausal status.  We reviewed potential toxicities associated with this including, but not limited to urogenital atrophy, vasomotor symptoms, musculoskeletal/joint stiffness or pain, and the potential for acceleration of bone density loss. We also discussed the importance of exercise in helping to alleviate these symptoms.     # Bone health  - Recommended taking at least 1000 mg of calcium per day  - Continue with vitamin D3  - Recommend weightbearing exercises  - Baseline DEXA scan scheduled locally -will plan to follow this every 2 years while she is on adjuvant endocrine therapy    # Extensive family history of Colon Cancer   - Has established care with genetic counselors  - Will undergo additional testing        RTC in 3 months       30 minutes spent on the date of the encounter doing chart review, review of test results, interpretation of tests, patient visit, and documentation       Dame Carolyne MD   of Medicine  Division of Hematology, Oncology and Transplantation  [facility]

## 2024-06-03 NOTE — PROGRESS NOTES
Medical Oncology Note      Roxana Card    Age: 73 year old        CC: Breast Cancer      HPI: Roxana Card is a 73 year old postmenopausal woman with recent diagnosis of screen detected cT1cN0 HR+/HER2 negative IDC of the left breast.  She underwent a left partial mastectomy and SLNB on 5/15/2024 with evidence of pT2 N0 disease.  Oncotype DX was sent on that specimen and resulted as 10.  She presents today for follow up.       Interval History  Since her surgery, she is overall feeling well and recovering.  She has returned to biking and playing [employer] ball shortly after her surgery.  She denies any systemic issues including pain, headaches, respiratory or cardiac issues, nausea, vomiting, or any other acute neurological issues.    She feels well and has no concerns today.    She does not remember having blood testing obtained for germline genetic testing and wonders about this today.        Her full oncologic history is as follows:   Oncologic History  Patient Active Problem List    Diagnosis Date Noted    Malignant neoplasm of upper-outer quadrant of left breast in female, estrogen receptor positive (H) 05/01/2024     Priority: High     Left breast cancer  3/20/2023 last normal screening mammo    4/15/2024 screening mammo showing focal asymmetry in the outer left breast at 3:00, 12-14 cm FN    4/23/2024 left breast ultrasound showing a 1.7 x 1.6 x 1.5 cm mass at 2:00, 11 cm FN, corresponding to the mammographic abnormality.  US guided biopsy demonstrates a grade 2 IDC.  ER (3+,> 95%), ID (1+, 30%), HER2 2+ negative by FISH, Ki-67 22%    5/6/2024 Contrast enhanced mammo  left breast demonstrates 1.8 cm of enhancement. No other enhancements     5/6/2024 US of left axillary LN normal     5/15/2024 left partial mastectomy and SLNB showing 1.6 cm of grade 2 IDC.  No LVI.  Negative margins.  ALH.  pT1cN0.  Biomarkers not repeated.  Oncotype Dx 10    6/3/2024 Start Letrozole         Coronary artery disease  involving native coronary artery of native heart without angina pectoris 05/06/2024     Priority: Medium    GERD (gastroesophageal reflux disease) 05/06/2024     Priority: Medium    Mixed hyperlipidemia 05/06/2024     Priority: Medium    Primary hypertension 05/06/2024     Priority: Medium    Vitamin D deficiency 05/06/2024     Priority: Medium    Recurrent UTI 09/20/2023     Priority: Medium    Stage 3a chronic kidney disease (H) 03/17/2022     Priority: Medium    Other microscopic colitis 11/23/2021     Priority: Medium    Bilateral primary osteoarthritis of knee 04/28/2021     Priority: Medium    Age-related osteoporosis without current pathological fracture 04/20/2021     Priority: Medium    Mild intermittent asthma 07/09/2019     Priority: Medium            Current Medications:  Current Outpatient Medications   Medication Sig Dispense Refill    acetaminophen (TYLENOL) 650 MG CR tablet Take 1,300 mg by mouth      albuterol (PROAIR HFA/PROVENTIL HFA/VENTOLIN HFA) 108 (90 Base) MCG/ACT inhaler Inhale 2 puffs into the lungs      ASPIRIN LOW DOSE 81 MG chewable tablet CHEW AND SWALLOW 1 TAB IN THE MORNING AND 1 TAB IN THE EVENING FOR BLOOD CLOT PREVENTION.      azelastine (ASTELIN) 0.1 % nasal spray Spray 2 sprays in nostril      Calcium Polycarbophil (FIBER) 625 MG tablet Take 1,250 mg by mouth      cetirizine (ZYRTEC) 10 MG tablet Take 10 mg by mouth      clindamycin (CLEOCIN) 150 MG capsule Take 4 Capsules 1 hour prior to dental appointment. (Patient not taking: Reported on 5/29/2024)      diltiazem ER (CARDIZEM SR) 60 MG 12 hr capsule Take 60 mg by mouth      Multiple Vitamin (MULTI-DAY VITAMINS PO) Take 1 tablet by mouth daily      nitroGLYcerin (NITROSTAT) 0.4 MG sublingual tablet Place 0.4 mg under the tongue      pantoprazole (PROTONIX) 40 MG EC tablet TAKE ONE TABLET BY MOUTH EVERY MORNING 30 MINUTES BEFORE BREAKFAST      REPATHA SURECLICK 140 MG/ML prefilled autoinjector Inject 140 mg Subcutaneous       "triamcinolone (KENALOG) 0.1 % external cream Apply to the affected area(s) if needed in the morning, at noon, and before bedtime. External cream; APPLY A SMALL AMOUNT 3 TIMES DAILY AS DIRECTED (Patient not taking: Reported on 5/29/2024)      vitamin D3 (CHOLECALCIFEROL) 125 MCG (5000 UT) tablet Take 5,000 Units by mouth           ECOG Performance Status: 0    Physical Examination:  /86   Pulse 83   Temp 98.6  F (37  C) (Oral)   Resp 16   Wt 79.4 kg (175 lb 1.6 oz)   SpO2 98%   BMI 34.29 kg/m    General:  Well appearing, well-nourished adult female in NAD.  HEENT:  Normocephalic.  Sclera anicteric.  MMM.  No lesions of the oropharynx.  Breast: Status post left partial mastectomy and SLNB.  Incisions for sentinel lymph node and partial mastectomy are both healing well.  Lymph:  No cervical, supraclavicular, or axillary LAD.  Chest:  CTA bilaterally.  No wheezes or crackles.  CV:  RRR.  No murmurs or gallops  Abd:  Soft/NT/ND.  BS normoactive.  No hepatosplenomegaly.  Ext:  No pitting edema of the bilateral lower extremities.  Pulses 2+ and symmetric.  Musculo:  Strength 5/5 throughout.  Neuro:  Cranial nerves grossly intact.  Psych:  Mood and affect appear normal.    Laboratory Data:  No results found for: \"WBC\", \"HGB\", \"HCT\", \"MCV\", \"PLT\"  No results found for: \"NA\", \"HCO3\", \"CREATININE\", \"BUN\", \"ANIONGAP\", \"GLUCOSE\"  No results found for: \"ALT\", \"AST\", \"GGT\", \"ALKPHOS\", \"BILITOT\"  No results found for: \"INR\", \"PT\"      Radiology data:  I have personally reviewed the images of / or the following radiology data: As detailed in the oncology timeline      Pathology and other data:  I have personally reviewed the following data: As detailed in the oncology timeline    5/15/2024  A. Lymph node, LEFT axillary, sentinel #1, excision:  -One benign lymph node (0/1)     B. LEFT breast, upper outer quadrant, RFID localized segmental mastectomy:  -INVASIVE BREAST CARCINOMA OF NO SPECIAL TYPE (INVASIVE DUCTAL " CARCINOMA), BYRON GRADE 2, size 16 mm  -No lymphovascular invasion identified  -Margins are uninvolved by invasive carcinoma  -Invasive carcinoma is 4 mm from the nearest margin (anterior) and > 5 mm from the remaining margins  -Atypical lobular hyperplasia (ALH)  -Other findings: Few microcysts with apocrine metaplasia  -Calcifications associated with invasive carcinoma and benign acini  -Prior core biopsy site changes  -See tumor synoptic below     C. LEFT breast, new inferior margin, excision:  -Benign breast tissue with fibrocystic change (including microcysts)  -Calcifications associated with benign acini  -Negative for atypia or malignancy     INVASIVE CARCINOMA OF THE BREAST: Resection   8th Edition - Protocol posted: 9/20/2023INVASIVE CARCINOMA OF THE BREAST: RESECTION - All Specimens  SPECIMEN   Procedure  Excision (less than total mastectomy)   Specimen Laterality  Left   TUMOR   Tumor Site  Upper outer quadrant   Histologic Type  Invasive carcinoma of no special type (ductal)   Histologic Grade (Etters Histologic Score)     Glandular (Acinar) / Tubular Differentiation  Score 2   Nuclear Pleomorphism  Score 3   Mitotic Rate  Score 2   Overall Grade  Grade 2 (scores of 6 or 7)   Tumor Size  Greatest dimension of largest invasive focus (Millimeters): 16 mm   Additional Dimension (Millimeters)  15 mm     12 mm   Tumor Focality  Single focus of invasive carcinoma   Ductal Carcinoma In Situ (DCIS)  Not identified   Lobular Carcinoma In Situ (LCIS)  Not identified   Lymphatic and / or Vascular Invasion  Not identified   Dermal Lymphatic and / or Vascular Invasion  No skin present   Microcalcifications  Present in invasive carcinoma     Present in non-neoplastic tissue   Treatment Effect in the Breast  No known presurgical therapy   MARGINS   Margin Status for Invasive Carcinoma  All margins negative for invasive carcinoma   Distance from Invasive Carcinoma to Closest Margin  4 mm   Closest Margin(s) to  Invasive Carcinoma  Anterior   Distance from Invasive Carcinoma to Posterior Margin  Greater than: 5 mm   Distance from Invasive Carcinoma to Superior Margin  Greater than: 5 mm   Distance from Invasive Carcinoma to Inferior Margin  Greater than: 5 mm   Distance from Invasive Carcinoma to Medial Margin  Greater than: 5 mm   Distance from Invasive Carcinoma to Lateral Margin  Greater than: 5 mm   REGIONAL LYMPH NODES   Regional Lymph Node Status  All regional lymph nodes negative for tumor   Total Number of Lymph Nodes Examined (sentinel and non-sentinel)  1   Number of [place] Nodes Examined  1   pTNM CLASSIFICATION (AJCC 8th Edition)   Reporting of pT, pN, and (when applicable) pM categories is based on information available to the pathologist at the time the report is issued. As per the AJCC (Chapter 1, 8th Ed.) it is the managing physician s responsibility to establish the final pathologic stage based upon all pertinent information, including but potentially not limited to this pathology report.   pT Category  pT1c   pN Category  pN0   N Suffix  (sn)   Breast Biomarker Testing Performed on Previous Biopsy     Estrogen Receptor (ER) Status  Positive (greater than 10% of cells demonstrate nuclear positivity)   Percentage of Cells with Nuclear Positivity  %   Breast Biomarker Testing Performed on Previous Biopsy     Progesterone Receptor (PgR) Status  Positive   Percentage of Cells with Nuclear Positivity  30 %   Breast Biomarker Testing Performed on Previous Biopsy     HER2 (by immunohistochemistry)  Equivocal (Score 2+)   Percentage of Cells with Uniform Intense Complete Membrane Staining  0 %   Breast Biomarker Testing Performed on Previous Biopsy     Ki-67 Percentage of Positive Nuclei  22 %   Testing Performed on Case Number  [id number]   Comment(s)  Best block for ancillary testing: B12   .       Assessment and Recommendations  Roxana Card  is a 73 year old postmenopausal  woman with a left breast  pT1cN0 HR+/HER2 negative IDC of the left breast.    # Left breast cancer  - No adjuvant chemotherapy in light of low Oncotype score  - Will add on an appointment to obtain germline genetic testing if this is not in lab already  - Recommend discussions regarding adjuvant radiation therapy with Rad Onc  - Recommend adjuvant endocrine therapy -we discussed aromatase inhibitors in light of her postmenopausal status.  We reviewed potential toxicities associated with this including, but not limited to urogenital atrophy, vasomotor symptoms, musculoskeletal/joint stiffness or pain, and the potential for acceleration of bone density loss. We also discussed the importance of exercise in helping to alleviate these symptoms.     # Bone health  - Recommended taking at least 1000 mg of calcium per day  - Continue with vitamin D3  - Recommend weightbearing exercises  - Baseline DEXA scan scheduled locally -will plan to follow this every 2 years while she is on adjuvant endocrine therapy    # Extensive family history of Colon Cancer   - Has established care with genetic counselors  - Will undergo additional testing        RTC in 3 months       30 minutes spent on the date of the encounter doing chart review, review of test results, interpretation of tests, patient visit, and documentation       Dame Carolyne MD   of Medicine  Division of Hematology, Oncology and Transplantation  [facility]

## 2024-06-04 NOTE — TELEPHONE ENCOUNTER
MEDICAL RECORDS REQUEST   Radiation Oncology  [address] [place] [address]  Fax: [phone]          FUTURE VISIT INFORMATION                                                   Roxana ALEISHA Card, : 1950 scheduled for future visit at [facility] Radiation Oncology    RECORDS REQUESTED FOR VISIT                                                     Action 2024 9:58 AM    Action Taken - No history of prior radiation.     BREAST     OFFICE NOTE from medical oncologist Epic 6/3/24: Dr. Dame Barfield   OFFICE NOTE from surgeon/plastic surgeon [facility] 24: Dr. America Fairbanks   OPERATIVE/BREAST BIOPSY REPORTS [facility]/ [facility]- [facility] 5/15/24: Mastectomy   24: US Breast Bx    MEDICATION LIST [facility]    LABS     PATHOLOGY REPORTS Report in [facility]/ [facility] Path Consult:  24: [id number] ([id number])    Epic:  5/15/24: [id number]     [facility]:  24: [id number] (positive)   24: HER2   ANYTHING RELATED TO DIAGNOSIS [facility] Most recent 24   IMAGING (NEED IMAGES & REPORT)     MAMMO/SURGICAL BREAST IMGS/SPECIMEN RADIOGRAPH PACS 5/15/24-19   [facility]TRASOUND PACS 24: US Breast Bx  5/10/24, 24: US Breast

## 2024-06-05 ENCOUNTER — TELEPHONE (OUTPATIENT)
Dept: ONCOLOGY | Facility: CLINIC | Age: 74
End: 2024-06-05
Payer: COMMERCIAL

## 2024-06-05 DIAGNOSIS — Z80.0 FAMILY HISTORY OF COLON CANCER: ICD-10-CM

## 2024-06-05 DIAGNOSIS — C50.412 MALIGNANT NEOPLASM OF UPPER-OUTER QUADRANT OF LEFT BREAST IN FEMALE, ESTROGEN RECEPTOR POSITIVE (H): Primary | ICD-10-CM

## 2024-06-05 DIAGNOSIS — Z80.1 FAMILY HISTORY OF LUNG CANCER: ICD-10-CM

## 2024-06-05 DIAGNOSIS — Z17.0 MALIGNANT NEOPLASM OF UPPER-OUTER QUADRANT OF LEFT BREAST IN FEMALE, ESTROGEN RECEPTOR POSITIVE (H): Primary | ICD-10-CM

## 2024-06-05 NOTE — TELEPHONE ENCOUNTER
6/5/2024    I contacted Roxana to update her on the status of her prior authorization for genetic testing. Despite having a genetic counseling note, the insurance will not cover the testing. I called to provide this update and discuss  alternative patient-pay genetic testing options with a different laboratory, but she was unavailable. I left my contact information for her to reach out.     Shruti James MS, [facility]  Licensed, Certified Genetic Counselor  Phone: [phone]

## 2024-06-06 NOTE — TELEPHONE ENCOUNTER
6/6/2024    I spoke with Roxana today about her genetic testing. During her genetic counseling appointment on 5/30/2024, Roxana decided to proceed with the Marcial syndrome genes with automatic reflex to the Hereditary Cancer Comprehensive Expanded panel through [facility]. Therefore, a prior authorization was initiated.    Our billing specialists indicated that despite having a genetic counseling note, Roxana's insurance will not cover the genetic testing.  Therefore, her oncology team has cancelled her genetic testing through [facility].     I called Roxana today to discuss alternative genetic testing options at a lower cost. I offered the patient-pay option through [facility] in which she would pay a flat fee up front for the testing. Roxana expressed understanding and indicated that she would like to proceed with this option. Therefore, her genetic [facility] for the Marcial syndrome genes with automatic reflex to the Multi-Cancer panel. Consent was verbally obtained for this testing. Results should be available in 3-4 weeks once the lab receives the sample. Roxana has a blood draw appointment scheduled for 6/13/2024. Roxana will either have a telephone visit or video visit to discuss the results.      Roxana expressed understanding and had no additional questions.     Shruti James MS, [facility]  Licensed, Certified Genetic Counselor  Phone: [phone]

## 2024-06-10 ENCOUNTER — PRE VISIT (OUTPATIENT)
Dept: RADIATION ONCOLOGY | Facility: CLINIC | Age: 74
End: 2024-06-10

## 2024-06-11 ENCOUNTER — TELEPHONE (OUTPATIENT)
Dept: SURGERY | Facility: CLINIC | Age: 74
End: 2024-06-11
Payer: COMMERCIAL

## 2024-06-11 NOTE — TELEPHONE ENCOUNTER
"Pt left a  requesting a call back regarding a \"hard area\" to her left breast incision site. S/P Left RFID seed localized segmental mastectomy and Axillary sentinel Lymph node biopsy on 5/15/24. Pt reports this \"hard area\" has been present for the past two days the size of about a \"tangerine\" pt states. No trauma to the area. Pt denies swelling, pain, redness or warmth to the area. Incision is intact per pt.  No other concerning symptoms per pt. Pt advised RN would update  and RN would reach out to pt with any additional information....Ivy Ibanez RN    "

## 2024-06-12 NOTE — TELEPHONE ENCOUNTER
replied back after reviewing photos that it looks ok to her and that likely she has a large seroma that will take time to heal. She also advised that pt should wear a supportive bra. Slyce message sent to pt with this information and pt read [place] on 6/12/24. Closing encounter and will reopen if pt reaches out..Ivy Ibanez RN

## 2024-06-13 ENCOUNTER — OFFICE VISIT (OUTPATIENT)
Dept: RADIATION ONCOLOGY | Facility: CLINIC | Age: 74
End: 2024-06-13
Payer: COMMERCIAL

## 2024-06-13 ENCOUNTER — LAB (OUTPATIENT)
Dept: LAB | Facility: CLINIC | Age: 74
End: 2024-06-13
Payer: COMMERCIAL

## 2024-06-13 VITALS
BODY MASS INDEX: 34.36 KG/M2 | WEIGHT: 175 LBS | TEMPERATURE: 97.9 F | DIASTOLIC BLOOD PRESSURE: 83 MMHG | RESPIRATION RATE: 18 BRPM | OXYGEN SATURATION: 98 % | SYSTOLIC BLOOD PRESSURE: 143 MMHG | HEART RATE: 72 BPM | HEIGHT: 60 IN

## 2024-06-13 DIAGNOSIS — C50.412 MALIGNANT NEOPLASM OF UPPER-OUTER QUADRANT OF LEFT BREAST IN FEMALE, ESTROGEN RECEPTOR POSITIVE (H): ICD-10-CM

## 2024-06-13 DIAGNOSIS — Z17.0 MALIGNANT NEOPLASM OF UPPER-OUTER QUADRANT OF LEFT BREAST IN FEMALE, ESTROGEN RECEPTOR POSITIVE (H): ICD-10-CM

## 2024-06-13 DIAGNOSIS — Z80.0 FAMILY HISTORY OF COLON CANCER: ICD-10-CM

## 2024-06-13 DIAGNOSIS — Z80.1 FAMILY HISTORY OF LUNG CANCER: ICD-10-CM

## 2024-06-13 PROCEDURE — 99205 OFFICE O/P NEW HI 60 MIN: CPT | Performed by: SURGERY

## 2024-06-13 PROCEDURE — 99000 SPECIMEN HANDLING OFFICE-LAB: CPT

## 2024-06-13 PROCEDURE — 99417 PROLNG OP E/M EACH 15 MIN: CPT | Performed by: SURGERY

## 2024-06-13 PROCEDURE — 36415 COLL VENOUS BLD VENIPUNCTURE: CPT

## 2024-06-13 ASSESSMENT — PAIN SCALES - GENERAL: PAINLEVEL: NO PAIN (1)

## 2024-06-13 NOTE — PATIENT INSTRUCTIONS
Please contact [facility] RN with questions or concerns following today's appointment: [phone].       Please feel free to leave a detailed message if your call is not answered.    If your call is not received before 3:00 PM, it may not be returned until the following business day.    If you are receiving radiation treatment and need assistance after 3:00 PM or on the weekends, please call [phone] and ask to speak to the radiation oncologist on-call.    Thank you!    Anjana BUTLER

## 2024-06-13 NOTE — NURSING NOTE
"INITIAL PATIENT ASSESSMENT      Diagnosis: breast cancer (left breast)    Prior radiation therapy: None    Prior chemotherapy: None    Prior hormonal therapy: No    Pain Eval:  \"1/10\" per patient, reports incisional \"firmness\"    Psychosocial  Living arrangements: lives at home in [place], presents to clinic with   Fall Risk: independent  MIPS Falls Risk Screening Completed: Yes Result: Negative   referral needs: Not needed    Advanced Directive: Yes - Location: family has copy  Implantable Cardiac Device: No  Authorization To Share Protected Health Information: YES - Date: 6/13/2024    Onset of menopause: patient reports hysterectomy in 2007  Urine Pregnancy Testing Needed: No, s/p hysterectomy.    Review of Systems     Constitutional: Negative.    HENT: Negative.     Eyes: Negative.    Respiratory: Negative.     Cardiovascular:  Positive for leg swelling. Negative for chest pain, palpitations, orthopnea, claudication and PND.        Patient reports intermittent bilateral feet swelling, relief with elevation.   Gastrointestinal: Negative.    Genitourinary: Negative.    Musculoskeletal: Negative.    Skin: Negative.    Neurological: Negative.    Endo/Heme/Allergies: Negative.    Psychiatric/Behavioral: Negative.       Nurse face-to-face time: Level 5:  over 15 min face to face time.    Anjana Kendall RN BSN OCN CBCN    "

## 2024-06-18 NOTE — PROGRESS NOTES
Department of Radiation Oncology  [facility] Cancer Center  [facility]  [address] [place] [id number]  [phone]       Consultation Note    Name: Roxana Card MRN: [id number]   : 1950   Date of Service: 2024   Referring: Dr. Fairbanks/ Dr. Aguilar     Reason for consultation: LEFT Stage IA breast cancer s/p breast conservation surgery    History of Present Illness     Ms. Card is a 73 year old female stage IA LEFT ER positive/SC positive/HER2 negative invasive ductal carcinoma status post breast conservation surgery (, 5/15/2024) with pathology demonstrating 16 mm invasive ductal carcinoma, grade 2, single focus, no LVSI, widely negative margins (closest margin was 4 mm anteriorly, remainder were 5 mm margins), 0/1 sentinel lymph node, pT1c N0.  Her Oncotype score was 10, and she would not be receiving any chemotherapy but endocrine therapy (Dr. Aguilar).    Briefly, her oncologic issues as follows:    Patient initially underwent a screening mammogram on 4/15/2024, which demonstrated a focal asymmetry in the left outer breast, 3 o'clock position, approximately 12 to 14 cm from the nipple.    She underwent a diagnostic mammogram and ultrasound on 2024 which informed an abnormality in the left breast, and demonstrated an irregular hypoechoic mass measuring up to 17 mm in size, the 2 o'clock position, 11 cm from the nipple.    She underwent biopsy on 2024 with pathology demonstrating invasive ductal carcinoma, grade 2, ER positive/SC positive/HER2 negative.    She underwent ultrasound of the axilla on 2024, which demonstrated benign-appearing lymph nodes.    She underwent breast conservation surgery on 5/15/2024 under care of . Pathology demonstrating 16 mm invasive ductal carcinoma, grade 2, single focus, no LVSI, widely negative margins (closest margin was 4 mm anteriorly, remainder were 5 mm margins),  "0/1 sentinel lymph node, pT1c N0.      Her Oncotype score was 10, and she would not be receiving any chemotherapy but endocrine therapy (Dr. Aguilar).    Today, she presents for consideration of adjuvant radiotherapy to the left breast.Today, patient denies any breast tenderness, pain, drainage, fevers, chills, extremity range of motion difficulties, chest pain, dyspnea, or weight loss.     Past Medical History:   Past Medical History:   Diagnosis Date    Age-related osteoporosis without current pathological fracture 04/20/2021    Breast cancer (H) 04/23/2024    Left Breast    CAD (coronary artery disease)     GERD (gastroesophageal reflux disease) 05/06/2024    Microscopic colitis     resolved    Mild intermittent asthma 07/09/2019    Osteoarthritis        Past Surgical History:   Past Surgical History:   Procedure Laterality Date    BREAST BIOPSY, CORE RT/LT Left 04/23/2024    CV PCI  05/2016    HYSTERECTOMY, MEHRDAD  2007    for prolapse    LUMPECTOMY BREAST WITH SENTINEL NODE, COMBINED Left 05/15/2024    Procedure: LEFT Radiofrequency Identification Seed-Localized Segmental Mastectomy (=\"Lumpectomy\"), LEFT Axillary [place] Lymph Node Biopsy;  Surgeon: America Fairbanks MD;  Location: [facility] OR    RECTAL FISSURE SURGERY      RELEASE TRIGGER FINGER      x6 per patient    REPLACEMENT TOTAL KNEE Bilateral 10/23/2023    TONSILLECTOMY         Chemotherapy History:  No prior chemotherapy    Radiation History:  No prior radiation    Pregnant: No  Implanted Cardiac Devices: No    Medications:  Current Outpatient Medications   Medication Sig Dispense Refill    acetaminophen (TYLENOL) 650 MG CR tablet Take 1,300 mg by mouth 2 times daily      ASPIRIN LOW DOSE 81 MG chewable tablet CHEW AND SWALLOW 1 TAB IN THE MORNING AND 1 TAB IN THE EVENING FOR BLOOD CLOT PREVENTION.      Calcium Polycarbophil (FIBER) 625 MG tablet Take 1,250 mg by mouth      cetirizine (ZYRTEC) 10 MG tablet Take 10 mg by mouth      clindamycin (CLEOCIN) " 150 MG capsule       diltiazem ER (CARDIZEM SR) 60 MG 12 hr capsule Take 60 mg by mouth      letrozole (FEMARA) 2.5 MG tablet Take 1 tablet (2.5 mg) by mouth daily 30 tablet 11    Multiple Vitamin (MULTI-DAY VITAMINS PO) Take 1 tablet by mouth daily      pantoprazole (PROTONIX) 40 MG EC tablet TAKE ONE TABLET BY MOUTH EVERY MORNING 30 MINUTES BEFORE BREAKFAST      REPATHA SURECLICK 140 MG/ML prefilled autoinjector Inject 140 mg Subcutaneous      triamcinolone (KENALOG) 0.1 % external cream       vitamin D3 (CHOLECALCIFEROL) 125 MCG (5000 UT) tablet Take 5,000 Units by mouth      albuterol (PROAIR HFA/PROVENTIL HFA/VENTOLIN HFA) 108 (90 Base) MCG/ACT inhaler Inhale 2 puffs into the lungs (Patient not taking: Reported on 6/13/2024)      azelastine (ASTELIN) 0.1 % nasal spray Spray 2 sprays in nostril (Patient not taking: Reported on 6/13/2024)      nitroGLYcerin (NITROSTAT) 0.4 MG sublingual tablet Place 0.4 mg under the tongue (Patient not taking: Reported on 6/13/2024)       No current facility-administered medications for this visit.     Facility-Administered Medications Ordered in Other Visits   Medication Dose Route Frequency Provider Last Rate Last Admin    lidocaine 1 % 9 mL  9 mL Intradermal Once Jill Loyola MD             Allergies:     Allergies   Allergen Reactions    Penicillins Hives and Swelling    Sulfamethoxazole-Trimethoprim Hives and Swelling    Oxycodone Itching    Lisinopril Cough     ITCHING, TINGLING    Atorvastatin Other (See Comments)     Joint pain    Tramadol Nausea and Vomiting and Other (See Comments)     Dizziness       Social History:    Social History     Tobacco Use    Smoking status: Never    Smokeless tobacco: Never   Substance Use Topics    Alcohol use: Not Currently    Drug use: Never       Family History:  Family History   Problem Relation Age of Onset    Colon Cancer Father     Colon Cancer Paternal Aunt     Colon Cancer Paternal Uncle         x5 paternal uncles    Lung Cancer  Paternal Uncle     Colon Cancer Cousin         Paternal male 1st Cousin x5    Breast Cancer Cousin         Maternal 1st Female Cousin and that cousin's daughter       Review of Systems   A 10-point review of systems was performed. Pertinent findings are noted in the HPI.    Physical Exam   ECOG Status: 1    Vitals:  BP (!) 143/83 (BP Location: Right arm, Patient Position: Chair, Cuff Size: Adult Regular)   Pulse 72   Temp 97.9  F (36.6  C) (Oral)   Resp 18   Ht 1.524 m (5')   Wt 79.4 kg (175 lb)   SpO2 98%   BMI 34.18 kg/m      Gen: Alert, in NAD  Head: NC/AT  Eyes: PERRL, EOMI, sclera anicteric  Ears: No external auricular lesions  Nose/sinus: No rhinorrhea or epistaxis  Breast:  no upper extremity range of motion limitation     Oral cavity/oropharynx: MMM, no visible oral cavity lesions  Neck: Full ROM, supple, no palpable adenopathy  Pulm: No wheezing, stridor or respiratory distress  CV: Extremities are warm and well-perfused, no cyanosis, no pedal edema  Abdominal: Normal bowel sounds, soft, nontender, no masses  Musculoskeletal: Normal bulk and tone  Skin: Normal color and turgor  Neuro: A/Ox3, CN II-XII intact, normal gait    Imaging/Path/Labs   Imaging: per HPI, personally reviewed and in agreement     Path: per HPI, personally reviewed and in agreement     Labs: per HPI, personally reviewed and in agreement     I have personally reviewed notes from Dr. Fairbanks, Dr. Aguilar    Assessment      Ms. Card is a 73 year old female stage IA LEFT ER positive/VT positive/HER2 negative invasive ductal carcinoma status post breast conservation surgery (, 5/15/2024) with pathology demonstrating 16 mm invasive ductal carcinoma, grade 2, single focus, no LVSI, widely negative margins (closest margin was 4 mm anteriorly, remainder were 5 mm margins), 0/1 sentinel lymph node, pT1c N0.  Her Oncotype score was 10, and she would not be receiving any chemotherapy but endocrine therapy (Dr. Aguilar).    We discussed  the role of adjuvant whole breast radiotherapy after breast conservation surgery per NCCN guidelines. In patients with negative axillary lymph nodes, WBRT +/- boost is standard of care.  In general, the benefits for whole breast radiation were reviewed and explained that adjuvant whole breast radiation following wide local excision decreases the risk of local recurrence by two-thirds to three-fourths and improves overall survival (Victor Manuel et al., NSABP B-06, NE, 2002; EBCTCG Metanalysis, Lancet 2011).      The options of conventional fractionation versus hypofractionation were reviewed.  However, we recommend hypofractionated radiation therapy based on results from the [place] hypofractionation trial (Dom et al, [place], 2010) and the update of the [place] START trial (John et al, Lancet Oncol, 2013), which both show equivalent local control, survival and cosmesis with these shorter treatment schedules as compared to conventional radiation fractionation.    Rather, patient did except express possible concern from traveling a long distance, she lives nearly 1.5 to 2 hours away, we also discussed the possibility of a short course of radiation, per the (FAST Forward Trial, Lancet, 2020).  We discussed that this is an option, and overall local control and side effect profile appear similar to a 3-week regimen, but that there is only 5-year data available.    We also discussed the role of RT omission, given that this benefit is reduced for older patients. In the CALGB 9343 (Eloy, SIL 2013) trial, elibiity criteria for this trial was patients over the age of 70, T1 tumors  (up to 2cm in size), estrogen receptor positive, node negative, and with negative margins. Patient were randomized after breast conservation to tamoxifen alone versus tamoxifen plus radiation. The locoregional recurrence was 2% (with RT) versus 10% (without RT) at 10 years, but there was no difference in overall survival.    Additional time was spent  specifically discussing the additional risk of cardiovascular disease due to radiation therapy. We reviewed retrospective data attributing a relative risk increase of 7.4% per Gy of mean heart dose (Jenae et al., NEJ, 2013). We discussed the meaning of relative risk in the setting of any other risk factors for cardiovascular disease, irrespective of the cancer diagnosis and/or treatment. Finally, we reviewed treatment techniques utilized in modern radiation therapy to minimize radiation dose to the heart, including deep inspiratory breath hold (DIBH).     Further, we discussed the logistics of treatment planning and delivery in detail with the patient. We also discussed side effects, including short term risks of fatigue and skin reaction, and long term risks of pneumonitis, lung fibrosis, soft tissue fibrosis, skin changes, breast contour changes, rib fractures, cardiac damage, secondary cancers, lymphedema, and thyroid dysfunction. We described the use of 3D-conformal radiotherapy to minimize dose to the normal tissues, while adequately covering the target tissues, and the ability of this technique to decrease potential for toxicity.     Plan     1. After extensive discussion, patient wishes to proceed to think about her options, particularly involving radiation (3 weeks versus fast for regimen) versus RT omission.I also advised for potential second opinion closer to home, which patient will attempt to arrange on her own. Patient will call us within the next 1 week or so, with her final decision..    2. Patient has already met with medical oncology (Dr. Aguilar) and has a oncotype of 10 and will not receive chemotherapy. Patient will receive endocrine therapy.    All benefits and risks discussed, and patient is in agreement with the oncologic plan discussed above.     Thank you for allowing me to participate in your patient's care.  If you should require any additional information, please do not hesitate in  contacting me.        Mikie Orellana MD  Department of Radiation Oncology  [facility]     A total of 90 minutes were spent on this visit, including preparation for this visit, face to face with patient, and medical decision making. Medical decision-making included consideration and possible diagnosis, management options, complex record review, review of diagnostic tests, consideration and discussion of significant complications based up medical history/comorbidities, and discussion with providers involved in care of the patient.

## 2024-06-21 LAB — SCANNED LAB RESULT: NORMAL

## 2024-06-24 NOTE — PROGRESS NOTES
"Virtual Visit Details  Type of service:  Telephone Visit   Originating Location (pt. Location): Home  Distant Location (provider location):  Off-site    6/26/2024    Referring Provider: Dame Carolyne MD     Presenting Information:  I spoke to Roxana today to discuss her genetic testing results. Her blood was drawn on 6/13/2024. The Marcial syndrome genes with automatic reflex to the Multi-Cancer panel was ordered from [facility]. This testing was done because of Roxana's personal history of breast cancer and family history of colon cancer.     Genetic Testing Result: NEGATIVE  Roxana is negative for mutations in the AIP, ALK, APC, KEESHA, AXIN2, BAP1, BARD1, BLM, BMPR1A, BRCA1, BRCA2, BRIP1, CDC73, CDH1, CDK4, CDKN1B, CDKN2A, CHEK2, CTNNA1, DICER1, EGFR, EPCAM, FH, FLCN, GREM1, HOXB13, KIT, LZTR1, MAX, MBD4, MEN1, MET, MITF, MLH1, MSH2, MSH3, MSH6, MUTYH, NF1, NF2, NTHL1, PALB2, PDGFRA, PMS2, POLD1, POLE, POT1, TJILC8H, PTCH1, PTEN, RAD51C, RAD51D, RB1, RET, SDHA, SDHAF2, SDHB, SDHC, SDHD, SMAD4, SMARCA4, SMARCB1, SMARCE1, STK11, SUFU, NHSI556, TP53, TSC1, TSC2, and VHL genes. No mutations were found in any of the 70 genes analyzed. This test involved sequencing and deletion/duplication analysis of all genes with the exceptions of EPCAM and GREM1 (deletions/duplications only), MITF (only the status of the c.952G>A (p.E318K) alteration is analyzed and reported), and SDHA (sequencing only).      A copy of the test report can be found in the Laboratory tab, dated 6/13/2024, and named \"LABORATORY MISCELLANEOUS ORDER\". The report is scanned in as a linked document.    Interpretation:  We discussed several different interpretations of this negative test result.    One explanation may be that there is a different gene or combination of genes and environment that are associated with the cancers in this family.  It is possible that her relatives diagnosed with cancer did have a mutation and she did not inherit it.  There " is also a small possibility that there is a mutation in one of these genes, and the testing laboratory could not find it with their current testing methods.       Screening:  Based on this negative test result, it is important for Roxana and her relatives to refer back to the family history for appropriate cancer screening.    Roxana should continue to follow all breast cancer treatment and future follow-up recommendations as made by her oncology team.  Roxana's close female relatives remain at increased risk for breast cancer given their family history. Breast cancer screening is generally recommended to begin approximately 10 years younger than the earliest age of breast cancer diagnosis in the family, or at age 40, whichever comes first. In this family, screening may begin at age 40. Breast screening options should be discussed with an individual's primary care provider and a Genetic Counselor, to determine at what age to begin screening, what screening is appropriate, and if additional screening (such as breast MRI) is necessary based on personal/family history factors.    Per National Comprehensive Cancer Network (NCCN) guidelines, individuals with a first degree relative with colorectal cancer diagnosed at any age should begin colonoscopy at age 40, or 10 years before the earliest diagnosis of colorectal cancer, whichever is first. In this family, colonoscopy should start at age 40. Colonoscopy should be repeated every 5 years, or based on colonoscopy findings. This would apply to Roxana and her siblings. These recommendations may change based on personal and family history as well as personal preference, and should be discussed with an individuals physician.      Other population cancer screening options, such as those recommended by the American Cancer Society and the National Comprehensive Cancer Network (NCCN), are also appropriate for Roxana and her family. These screening recommendations may change if  there are changes to Roxana's personal and/or family history of cancer.   Final screening recommendations should be made by each individual's primary care provider.    Inheritance:  We reviewed autosomal dominant inheritance. We discussed that Roxana did not pass on an identifiable mutation in these genes to her children based on this test result.  Mutations in these genes do not skip generations.      Additional Testing Considerations:  Although Roxana's genetic testing result was negative, other relatives may still carry a gene mutation associated with colon cancer. Genetic counseling is recommended for Roxana's living brother,  the son of her brother who passed away, and other paternal relatives to discuss genetic testing options. If any of these relatives do pursue genetic testing, Roxana is encouraged to contact me so that we may review the impact of their test results on her.    Summary:  We do not have an explanation for Roxana's personal and family history of cancer. While no genetic changes were identified, Roxana may still be at risk for certain cancers due to family history, environmental factors, or other genetic causes not identified by this test.  Because of that, it is important that she continue with cancer screening based on her personal and family history as discussed above.    Genetic testing is rapidly advancing, and new cancer susceptibility genes will most likely be identified in the future.  Therefore, I encouraged Roxana to contact me regularly or if there are changes in her personal or family history.  This may change how we assess her cancer risk, screening, and the testing we would offer.    Plan:  1. A copy of the test results will be mailed to Roxana.  2. She plans to follow-up with her other providers.  3. She should contact me regularly, or sooner if her family history changes.    If Roxana has any further questions, I encouraged her to contact me at [phone].     Time spent over  the phone: 6 minutes.    Shruti James MS, [facility]  Licensed, Certified Genetic Counselor  Phone: [phone]

## 2024-06-26 ENCOUNTER — VIRTUAL VISIT (OUTPATIENT)
Dept: ONCOLOGY | Facility: CLINIC | Age: 74
End: 2024-06-26
Payer: COMMERCIAL

## 2024-06-26 DIAGNOSIS — C50.412 MALIGNANT NEOPLASM OF UPPER-OUTER QUADRANT OF LEFT BREAST IN FEMALE, ESTROGEN RECEPTOR POSITIVE (H): Primary | ICD-10-CM

## 2024-06-26 DIAGNOSIS — Z17.0 MALIGNANT NEOPLASM OF UPPER-OUTER QUADRANT OF LEFT BREAST IN FEMALE, ESTROGEN RECEPTOR POSITIVE (H): Primary | ICD-10-CM

## 2024-06-26 DIAGNOSIS — Z80.1 FAMILY HISTORY OF LUNG CANCER: ICD-10-CM

## 2024-06-26 DIAGNOSIS — Z80.0 FAMILY HISTORY OF COLON CANCER: ICD-10-CM

## 2024-06-26 PROCEDURE — 999N000069 HC STATISTIC GENETIC COUNSELING, < 16 MIN: Mod: TEL,95

## 2024-06-26 NOTE — Clinical Note
"6/26/2024      Roxana Card  [address] [facility] [address]      Dear Colleague,    Thank you for referring your patient, Roxana Card, to the [facility]. Please see a copy of my visit note below.    Virtual Visit Details  Type of service:  Telephone Visit   Originating Location (pt. Location): Home  Distant Location (provider location):  Off-site    6/26/2024    Referring Provider: Dame Carolyne MD     Presenting Information:  I spoke to Roxana today to discuss her genetic testing results. Her blood was drawn on 6/13/2024. The Marcial syndrome genes with automatic reflex to the Multi-Cancer panel was ordered from [facility]. This testing was done because of Roxana's personal history of breast cancer and family history of colon cancer.     Genetic Testing Result: NEGATIVE  Roxana is negative for mutations in the AIP, ALK, APC, KEESHA, AXIN2, BAP1, BARD1, BLM, BMPR1A, BRCA1, BRCA2, BRIP1, CDC73, CDH1, CDK4, CDKN1B, CDKN2A, CHEK2, CTNNA1, DICER1, EGFR, EPCAM, FH, FLCN, GREM1, HOXB13, KIT, LZTR1, MAX, MBD4, MEN1, MET, MITF, MLH1, MSH2, MSH3, MSH6, MUTYH, NF1, NF2, NTHL1, PALB2, PDGFRA, PMS2, POLD1, POLE, POT1, PTPPA6J, PTCH1, PTEN, RAD51C, RAD51D, RB1, RET, SDHA, SDHAF2, SDHB, SDHC, SDHD, SMAD4, SMARCA4, SMARCB1, SMARCE1, STK11, SUFU, YQVV374, TP53, TSC1, TSC2, and VHL genes. No mutations were found in any of the 70 genes analyzed. This test involved sequencing and deletion/duplication analysis of all genes with the exceptions of EPCAM and GREM1 (deletions/duplications only), MITF (only the status of the c.952G>A (p.E318K) alteration is analyzed and reported), and SDHA (sequencing only).      A copy of the test report can be found in the Laboratory tab, dated 6/13/2024, and named \"LABORATORY MISCELLANEOUS ORDER\". The report is scanned in as a linked document.    Interpretation:  We discussed several different interpretations of this negative test result.    One explanation " may be that there is a different gene or combination of genes and environment that are associated with the cancers in this family.  It is possible that her relatives diagnosed with cancer did have a mutation and she did not inherit it.  There is also a small possibility that there is a mutation in one of these genes, and the testing laboratory could not find it with their current testing methods.       Screening:  Based on this negative test result, it is important for Roxana and her relatives to refer back to the family history for appropriate cancer screening.    Roxana should continue to follow all breast cancer treatment and future follow-up recommendations as made by her oncology team.  Roxana's close female relatives remain at increased risk for breast cancer given their family history. Breast cancer screening is generally recommended to begin approximately 10 years younger than the earliest age of breast cancer diagnosis in the family, or at age 40, whichever comes first. In this family, screening may begin at age 40. Breast screening options should be discussed with an individual's primary care provider and a Genetic Counselor, to determine at what age to begin screening, what screening is appropriate, and if additional screening (such as breast MRI) is necessary based on personal/family history factors.    Per National Comprehensive Cancer Network (NCCN) guidelines, individuals with a first degree relative with colorectal cancer diagnosed at any age should begin colonoscopy at age 40, or 10 years before the earliest diagnosis of colorectal cancer, whichever is first. In this family, colonoscopy should start at age 40. Colonoscopy should be repeated every 5 years, or based on colonoscopy findings. This would apply to Roxana and her siblings. These recommendations may change based on personal and family history as well as personal preference, and should be discussed with an individuals physician.      Other  population cancer screening options, such as those recommended by the American Cancer Society and the National Comprehensive Cancer Network (NCCN), are also appropriate for Roxana and her family. These screening recommendations may change if there are changes to Roxana's personal and/or family history of cancer.   Final screening recommendations should be made by each individual's primary care provider.    Inheritance:  We reviewed autosomal dominant inheritance. We discussed that Roxana did not pass on an identifiable mutation in these genes to her children based on this test result.  Mutations in these genes do not skip generations.      Additional Testing Considerations:  Although Roxana's genetic testing result was negative, other relatives may still carry a gene mutation associated with colon cancer. Genetic counseling is recommended for Roxana's living brother,  the son of her brother who passed away, and other paternal relatives to discuss genetic testing options. If any of these relatives do pursue genetic testing, Roxana is encouraged to contact me so that we may review the impact of their test results on her.    Summary:  We do not have an explanation for Roxana's personal and family history of cancer. While no genetic changes were identified, Roxana may still be at risk for certain cancers due to family history, environmental factors, or other genetic causes not identified by this test.  Because of that, it is important that she continue with cancer screening based on her personal and family history as discussed above.    Genetic testing is rapidly advancing, and new cancer susceptibility genes will most likely be identified in the future.  Therefore, I encouraged Roxana to contact me regularly or if there are changes in her personal or family history.  This may change how we assess her cancer risk, screening, and the testing we would offer.    Plan:  1. A copy of the test results will be mailed to  Roxana.  2. She plans to follow-up with her other providers.  3. She should contact me regularly, or sooner if her family history changes.    If Roxana has any further questions, I encouraged her to contact me at [phone].     Time spent over the phone: 6 minutes.    Shruti James MS, [facility]  Licensed, Certified Genetic Counselor  Phone: [phone]      Again, thank you for allowing me to participate in the care of your patient.        Sincerely,        Shruti James GC

## 2024-06-26 NOTE — LETTER
Cancer Risk Management  Program Locations    [facility]  [facility]  Mailing Address  Cancer Risk Management Program  [address]  [place] [address]    New patient appointments  [phone]    June 26, 2024    Roxana Card  [address] [facility] [address]    Dear Roxana,    It was a pleasure talking with you via your virtual genetic counseling visit on 6/26/2024.  Below is a copy of the progress note from that recent visit through the Cancer Risk Management Program.  If you have any additional questions, please feel free to contact me.    Referring Provider: Dame Carolyne MD     Presenting Information:  I spoke to Roxana today to discuss her genetic testing results. Her blood was drawn on 6/13/2024. The Marcial syndrome genes with automatic reflex to the Multi-Cancer panel was ordered from [facility]. This testing was done because of Roxana's personal history of breast cancer and family history of colon cancer.     Genetic Testing Result: NEGATIVE  Roxana is negative for mutations in the AIP, ALK, APC, KEESHA, AXIN2, BAP1, BARD1, BLM, BMPR1A, BRCA1, BRCA2, BRIP1, CDC73, CDH1, CDK4, CDKN1B, CDKN2A, CHEK2, CTNNA1, DICER1, EGFR, EPCAM, FH, FLCN, GREM1, HOXB13, KIT, LZTR1, MAX, MBD4, MEN1, MET, MITF, MLH1, MSH2, MSH3, MSH6, MUTYH, NF1, NF2, NTHL1, PALB2, PDGFRA, PMS2, POLD1, POLE, POT1, QMGJQ3H, PTCH1, PTEN, RAD51C, RAD51D, RB1, RET, SDHA, SDHAF2, SDHB, SDHC, SDHD, SMAD4, SMARCA4, SMARCB1, SMARCE1, STK11, SUFU, [id number], TP53, TSC1, TSC2, and VHL genes. No mutations were found in any of the 70 genes analyzed. This test involved sequencing and deletion/duplication analysis of all genes with the exceptions of EPCAM and GREM1 (deletions/duplications only), MITF (only the status of the c.952G>A (p.E318K)  "alteration is analyzed and reported), and SDHA (sequencing only).      A copy of the test report can be found in the Laboratory tab, dated 6/13/2024, and named \"LABORATORY MISCELLANEOUS ORDER\". The report is scanned in as a linked document.    Interpretation:  We discussed several different interpretations of this negative test result.    One explanation may be that there is a different gene or combination of genes and environment that are associated with the cancers in this family.  It is possible that her relatives diagnosed with cancer did have a mutation and she did not inherit it.  There is also a small possibility that there is a mutation in one of these genes, and the testing laboratory could not find it with their current testing methods.       Screening:  Based on this negative test result, it is important for Roxana and her relatives to refer back to the family history for appropriate cancer screening.    Roxana should continue to follow all breast cancer treatment and future follow-up recommendations as made by her oncology team.  Roxana's close female relatives remain at increased risk for breast cancer given their family history. Breast cancer screening is generally recommended to begin approximately 10 years younger than the earliest age of breast cancer diagnosis in the family, or at age 40, whichever comes first. In this family, screening may begin at age 40. Breast screening options should be discussed with an individual's primary care provider and a Genetic Counselor, to determine at what age to begin screening, what screening is appropriate, and if additional screening (such as breast MRI) is necessary based on personal/family history factors.    Per National Comprehensive Cancer Network (NCCN) guidelines, individuals with a first degree relative with colorectal cancer diagnosed at any age should begin colonoscopy at age 40, or 10 years before the earliest diagnosis of colorectal cancer, whichever " is first. In this family, colonoscopy should start at age 40. Colonoscopy should be repeated every 5 years, or based on colonoscopy findings. This would apply to Roxana and her siblings. These recommendations may change based on personal and family history as well as personal preference, and should be discussed with an individuals physician.      Other population cancer screening options, such as those recommended by the American Cancer Society and the National Comprehensive Cancer Network (NCCN), are also appropriate for Roxana and her family. These screening recommendations may change if there are changes to Roxana's personal and/or family history of cancer.   Final screening recommendations should be made by each individual's primary care provider.    Inheritance:  We reviewed autosomal dominant inheritance. We discussed that Roxana did not pass on an identifiable mutation in these genes to her children based on this test result.  Mutations in these genes do not skip generations.      Additional Testing Considerations:  Although Roxana's genetic testing result was negative, other relatives may still carry a gene mutation associated with colon cancer. Genetic counseling is recommended for Roxana's living brother,  the son of her brother who passed away, and other paternal relatives to discuss genetic testing options. If any of these relatives do pursue genetic testing, Roxana is encouraged to contact me so that we may review the impact of their test results on her.    Summary:  We do not have an explanation for Roxana's personal and family history of cancer. While no genetic changes were identified, Roxana may still be at risk for certain cancers due to family history, environmental factors, or other genetic causes not identified by this test.  Because of that, it is important that she continue with cancer screening based on her personal and family history as discussed above.    Genetic testing is rapidly  advancing, and new cancer susceptibility genes will most likely be identified in the future.  Therefore, I encouraged Roxana to contact me regularly or if there are changes in her personal or family history.  This may change how we assess her cancer risk, screening, and the testing we would offer.    Plan:  1. A copy of the test results will be mailed to Roxana.  2. She plans to follow-up with her other providers.  3. She should contact me regularly, or sooner if her family history changes.    If Roxana has any further questions, I encouraged her to contact me at [phone].     Time spent over the phone: 6 minutes.    Shruti James MS, [facility]  Licensed, Certified Genetic Counselor  Phone: [phone]

## 2024-06-26 NOTE — PATIENT INSTRUCTIONS
Negative Genetic Test Result    Genetic Testing  Genetic testing involved a blood or saliva test which looked at the genetic information in select genes for variants associated with cancer risk.  This testing may have included analysis of a single gene due to a known variant in the family, multiple genes most associated with the cancers in a family, or an expanded panel of genes related to many types of cancers.     Results  There are several possible genetic test results, including:   Positive--a harmful mutation (also known as a  pathogenic  or  likely pathogenic  variant) was identified in a gene associated with increased cancer risk.  These risks, as well as medical management options, depend on the specific genetic variant identified.    Negative--no variants were identified in the genes analyzed   Variant of unknown significance--a variant was identified in one or more genes, though it is currently unclear how this impacts cancer risk in the family.  Genetic testing labs are working to collect evidence about these uncertain variants and may provide updates in the future.    What Does a Negative Genetic Test Result Mean?  A negative genetic test results means that no genetic changes (variants) were detected in the genes tested. While no inherited risk factors for cancer were identified, you and your family may be at risk for certain cancers due to family history, environmental factors, or other genetic causes not identified by this test.  It is also possible that your family may still be at risk of carrying a genetic risk factor which you did not inherit. Your genetic counselor can help interpret the result for you and your relatives.      It is important to note which genes were included in your test. A list of these genes can be found on your test result.    Screening Recommendations  A combination of personal and family history factors may inform cancer risk and medical management recommendations.   Population cancer screening options, such as those recommended by the American Cancer Society and the National Comprehensive Cancer Network (NCCN) are appropriate for many families at average risk for cancer.  However, earlier and/or more frequent screening may be recommended based on personal factors (lifestyle, exposures, medications, screening results), family history of cancer, and sometimes genetic factors.  These cancer risk management options should be discussed in more detail with an individual's medical providers.      Please call us if you have any questions or concerns.   Cancer Risk Management Program (Appointments: [phone])  Mukesh López, MS [facility]  [phone]  Shruti James, MS, [facility] [phone]  Corina Nascimento, MS, [facility]  [phone]  Libby Quintana, MS, [facility]  [phone]  Brittny Renteria, MS, [facility]  [phone]  Ana Cristina Bourgeois, MS, [facility] [phone]  Brittney Morrow, MS, [facility] [phone]

## 2024-06-26 NOTE — NURSING NOTE
Is the patient currently in the state of [place]? YES    Visit mode:TELEPHONE    If the visit is dropped, the patient can be reconnected by: TELEPHONE VISIT: Phone number: [phone]    Will anyone else be joining the visit? NO  (If patient encounters technical issues they should call [phone]    How would you like to obtain your AVS? MyChart    Are changes needed to the allergy or medication list? N/A    Are refills needed on medications prescribed by this physician? NO    Reason for visit: RECHECK    Martha ORTIZ

## 2024-07-11 ENCOUNTER — PATIENT OUTREACH (OUTPATIENT)
Dept: RADIATION ONCOLOGY | Facility: CLINIC | Age: 74
End: 2024-07-11
Payer: COMMERCIAL

## 2024-07-11 NOTE — PROGRESS NOTES
This RN contacted patient as originally consulted with Dr. Mikie Orellana on 6/13/2024 for radiation oncology at [facility].  During that visit, patient preference for radiation treatment closer to home and would follow-up with this RN regarding confirmation of plans.      This RN spoke with patient today, reports that she has met with Dr. Avery in [facility] and will be starting radiation treatment on Monday, 7/15/2024.  No questions or concerns from patient during today's call and patient expressed appreciation of contact.    Dr. Orellana updated and this RN will close care coordination.    Anjana Kendall, RN BSN [facility]

## 2024-09-06 NOTE — PROGRESS NOTES
Medical Oncology Note      Roxana Card    Age: 73 year old        CC: Breast Cancer      HPI: Roxana Card is a 73 year old postmenopausal woman with recent diagnosis of screen detected cT1cN0 HR+/HER2 negative IDC of the left breast.  She underwent a left partial mastectomy and SLNB on 5/15/2024 with evidence of pT2 N0 disease.  Oncotype DX was sent on that specimen and resulted as 10.  She started adjuvant letrozole on 6/3/2024 and completed adjuvant radiation therapy on 8/12/2024.  She presents today for follow-up.      Interval History  Unfortunately, she has had many issues related to adjuvant letrozole.    She endorses multiple episodes of hot flashes, and usually at nighttime, that are interrupting her sleep.  This is at times associated with night sweats.     She also endorses diffuse arthralgias, most notably in her right hip and right knee.  She also has associated back ache.  This is also most bothersome at nighttime or or after periods of being sedentary.    She has also had diffuse myalgias, although most notably in her right leg and right arm.  She feels that this is similar to when she had steroid associated myalgias.     She also reported daily dull headache, which have not been associated with any nausea, vomiting, or neurological symptoms.    She overall feels more fatigued and is needing to take naps on most days.            Her full oncologic history is as follows:   Oncologic History  Patient Active Problem List    Diagnosis Date Noted    Malignant neoplasm of upper-outer quadrant of left breast in female, estrogen receptor positive (H) 05/01/2024     Priority: High     Left breast cancer  3/20/2023 last normal screening mammo    4/15/2024 screening mammo showing focal asymmetry in the outer left breast at 3:00, 12-14 cm FN    4/23/2024 left breast ultrasound showing a 1.7 x 1.6 x 1.5 cm mass at 2:00, 11 cm FN, corresponding to the mammographic abnormality.  US guided biopsy  demonstrates a grade 2 IDC.  ER (3+,> 95%), WA (1+, 30%), HER2 2+ negative by FISH, Ki-67 22%    5/6/2024 Contrast enhanced mammo  left breast demonstrates 1.8 cm of enhancement. No other enhancements     5/6/2024 US of left axillary LN normal     5/15/2024 left partial mastectomy and SLNB showing 1.6 cm of grade 2 IDC.  No LVI.  Negative margins.  ALH.  pT1cN0.  Biomarkers not repeated.  Oncotype Dx 10    6/3/2024 Start Letrozole     6/13/2024 [facility] germline testing - negative     7/15/2024 - 8/12/2024 Adjuvant RT 5256 cGy in 20 fractions      Coronary artery disease involving native coronary artery of native heart without angina pectoris 05/06/2024     Priority: Medium    GERD (gastroesophageal reflux disease) 05/06/2024     Priority: Medium    Mixed hyperlipidemia 05/06/2024     Priority: Medium    Primary hypertension 05/06/2024     Priority: Medium    Vitamin D deficiency 05/06/2024     Priority: Medium    Recurrent UTI 09/20/2023     Priority: Medium    Stage 3a chronic kidney disease (H) 03/17/2022     Priority: Medium    Other microscopic colitis 11/23/2021     Priority: Medium    Bilateral primary osteoarthritis of knee 04/28/2021     Priority: Medium    Age-related osteoporosis without current pathological fracture 04/20/2021     Priority: Medium    Mild intermittent asthma 07/09/2019     Priority: Medium            Current Medications:  Current Outpatient Medications   Medication Sig Dispense Refill    acetaminophen (TYLENOL) 650 MG CR tablet Take 1,300 mg by mouth 2 times daily      ASPIRIN LOW DOSE 81 MG chewable tablet CHEW AND SWALLOW 1 TAB IN THE MORNING AND 1 TAB IN THE EVENING FOR BLOOD CLOT PREVENTION.      Calcium Polycarbophil (FIBER) 625 MG tablet Take 1,250 mg by mouth daily.      cetirizine (ZYRTEC) 10 MG tablet Take 10 mg by mouth daily.      clindamycin (CLEOCIN) 150 MG capsule       diltiazem ER (CARDIZEM SR) 60 MG 12 hr capsule Take 60 mg by mouth 2 times daily.      letrozole (FEMARA)  "2.5 MG tablet Take 1 tablet (2.5 mg) by mouth daily 30 tablet 11    Multiple Vitamin (MULTI-DAY VITAMINS PO) Take 1 tablet by mouth daily      nitroGLYcerin (NITROSTAT) 0.4 MG sublingual tablet Place 0.4 mg under the tongue.      pantoprazole (PROTONIX) 40 MG EC tablet TAKE ONE TABLET BY MOUTH EVERY MORNING 30 MINUTES BEFORE BREAKFAST      REPATHA SURECLICK 140 MG/ML prefilled autoinjector Inject 140 mg Subcutaneous      triamcinolone (KENALOG) 0.1 % external cream       vitamin D3 (CHOLECALCIFEROL) 125 MCG (5000 UT) tablet Take 5,000 Units by mouth daily.           ECOG Performance Status: 0    Physical Examination:  /85 (BP Location: Right arm, Patient Position: Sitting, Cuff Size: Adult Regular)   Pulse 81   Temp 97.6  F (36.4  C) (Oral)   Resp 16   Wt 79.5 kg (175 lb 4.8 oz)   SpO2 98%   BMI 34.24 kg/m    General:  Well appearing, well-nourished adult female in NAD.  HEENT:  Normocephalic.  Sclera anicteric.  MMM.  No lesions of the oropharynx.  Breast: Status post left partial mastectomy and SLNB. Well healed.   Lymph:  No cervical, supraclavicular, or axillary LAD.  Chest:  CTA bilaterally.  No wheezes or crackles.  CV:  RRR.  No murmurs or gallops  Abd:  Soft/NT/ND.  BS normoactive.  No hepatosplenomegaly.  Ext:  No pitting edema of the bilateral lower extremities.  Pulses 2+ and symmetric.  Musculo:  Strength 5/5 throughout.  Neuro:  Cranial nerves grossly intact.  Psych:  Mood and affect appear normal.    Laboratory Data:  No results found for: \"WBC\", \"HGB\", \"HCT\", \"MCV\", \"PLT\"  No results found for: \"NA\", \"HCO3\", \"CREATININE\", \"BUN\", \"ANIONGAP\", \"GLUCOSE\"  No results found for: \"ALT\", \"AST\", \"GGT\", \"ALKPHOS\", \"BILITOT\"  No results found for: \"INR\", \"PT\"      Radiology data:  I have personally reviewed the images of / or the following radiology data: As detailed in the oncology timeline      Pathology and other data:  I have personally reviewed the following data: As detailed in the oncology " timeline      Assessment and Recommendations  Roxana Card  is a 73 year old postmenopausal  woman with a left breast pT1cN0 HR+/HER2 negative IDC of the left breast.    # Left breast cancer  - No adjuvant chemotherapy in light of low Oncotype score  - Recommend adjuvant endocrine therapy - in light of multiple adverse effects associated with letrozole, we discussed following approach:   - Hold letrozole for the next 2 weeks and reinitiate again.  If symptoms recur within 2 weeks, would discontinue letrozole going forward and trial exemestane  - Annual mammo - Dx mammo due 2/2025    #Arthralgia  #Myalgia  Likely due to aromatase inhibitors.  Management as above.    - Although unlikely, will also obtain CK and a CMP today to eval for any muscle breakdown    # Bone health  - Continue calcium at least 1000 mg per day  - Continue with vitamin D3 - at least 1000u daily  - Recommend weightbearing exercises  - Dexa from 7/8/2024 showing osteopenia. Patient reports taking reclast annually for 3 years    # Extensive family history of Colon Cancer   - Has established care with genetic counselors - additional testing negative   - We discussed importance of staying up to date with cancer screening         RTC in 3 months       30 minutes spent on the date of the encounter doing chart review, review of test results, interpretation of tests, patient visit, and documentation       Dame Carolyne MD   of Medicine  Division of Hematology, Oncology and Transplantation  [facility]

## 2024-09-09 ENCOUNTER — LAB (OUTPATIENT)
Dept: LAB | Facility: CLINIC | Age: 74
End: 2024-09-09
Payer: COMMERCIAL

## 2024-09-09 ENCOUNTER — ONCOLOGY VISIT (OUTPATIENT)
Dept: ONCOLOGY | Facility: CLINIC | Age: 74
End: 2024-09-09
Attending: INTERNAL MEDICINE
Payer: COMMERCIAL

## 2024-09-09 VITALS
HEART RATE: 81 BPM | WEIGHT: 175.3 LBS | SYSTOLIC BLOOD PRESSURE: 137 MMHG | RESPIRATION RATE: 16 BRPM | TEMPERATURE: 97.6 F | DIASTOLIC BLOOD PRESSURE: 85 MMHG | BODY MASS INDEX: 34.24 KG/M2 | OXYGEN SATURATION: 98 %

## 2024-09-09 DIAGNOSIS — M79.10 MYALGIA: ICD-10-CM

## 2024-09-09 DIAGNOSIS — M25.50 ARTHRALGIA, UNSPECIFIED JOINT: ICD-10-CM

## 2024-09-09 DIAGNOSIS — Z17.0 MALIGNANT NEOPLASM OF UPPER-OUTER QUADRANT OF LEFT BREAST IN FEMALE, ESTROGEN RECEPTOR POSITIVE (H): Primary | ICD-10-CM

## 2024-09-09 DIAGNOSIS — C50.412 MALIGNANT NEOPLASM OF UPPER-OUTER QUADRANT OF LEFT BREAST IN FEMALE, ESTROGEN RECEPTOR POSITIVE (H): Primary | ICD-10-CM

## 2024-09-09 DIAGNOSIS — C50.412 MALIGNANT NEOPLASM OF UPPER-OUTER QUADRANT OF LEFT BREAST IN FEMALE, ESTROGEN RECEPTOR POSITIVE (H): ICD-10-CM

## 2024-09-09 DIAGNOSIS — Z17.0 MALIGNANT NEOPLASM OF UPPER-OUTER QUADRANT OF LEFT BREAST IN FEMALE, ESTROGEN RECEPTOR POSITIVE (H): ICD-10-CM

## 2024-09-09 LAB
ALBUMIN SERPL BCG-MCNC: 4.5 G/DL (ref 3.5–5.2)
ALP SERPL-CCNC: 50 U/L (ref 40–150)
ALT SERPL W P-5'-P-CCNC: 24 U/L (ref 0–50)
ANION GAP SERPL CALCULATED.3IONS-SCNC: 11 MMOL/L (ref 7–15)
AST SERPL W P-5'-P-CCNC: 29 U/L (ref 0–45)
BILIRUB SERPL-MCNC: 0.5 MG/DL
BUN SERPL-MCNC: 21.2 MG/DL (ref 8–23)
CALCIUM SERPL-MCNC: 9.9 MG/DL (ref 8.8–10.4)
CHLORIDE SERPL-SCNC: 105 MMOL/L (ref 98–107)
CK SERPL-CCNC: 126 U/L (ref 26–192)
CREAT SERPL-MCNC: 1.18 MG/DL (ref 0.51–0.95)
EGFRCR SERPLBLD CKD-EPI 2021: 49 ML/MIN/1.73M2
GLUCOSE SERPL-MCNC: 116 MG/DL (ref 70–99)
HCO3 SERPL-SCNC: 26 MMOL/L (ref 22–29)
POTASSIUM SERPL-SCNC: 4.8 MMOL/L (ref 3.4–5.3)
PROT SERPL-MCNC: 7.2 G/DL (ref 6.4–8.3)
SODIUM SERPL-SCNC: 142 MMOL/L (ref 135–145)

## 2024-09-09 PROCEDURE — 82550 ASSAY OF CK (CPK): CPT | Performed by: PATHOLOGY

## 2024-09-09 PROCEDURE — 80053 COMPREHEN METABOLIC PANEL: CPT | Performed by: PATHOLOGY

## 2024-09-09 PROCEDURE — 99214 OFFICE O/P EST MOD 30 MIN: CPT | Performed by: INTERNAL MEDICINE

## 2024-09-09 PROCEDURE — G0463 HOSPITAL OUTPT CLINIC VISIT: HCPCS | Performed by: INTERNAL MEDICINE

## 2024-09-09 PROCEDURE — 36415 COLL VENOUS BLD VENIPUNCTURE: CPT | Performed by: PATHOLOGY

## 2024-09-09 ASSESSMENT — PAIN SCALES - GENERAL: PAINLEVEL: MILD PAIN (2)

## 2024-09-09 NOTE — LETTER
9/9/2024      Roxana Card  [address] [facility] [address]      Dear Colleague,    Thank you for referring your patient, Roxana Card, to the [facility]. Please see a copy of my visit note below.    Medical Oncology Note      Roxana Card    Age: 73 year old        CC: Breast Cancer      HPI: Roxana Card is a 73 year old postmenopausal woman with recent diagnosis of screen detected cT1cN0 HR+/HER2 negative IDC of the left breast.  She underwent a left partial mastectomy and SLNB on 5/15/2024 with evidence of pT2 N0 disease.  Oncotype DX was sent on that specimen and resulted as 10.  She started adjuvant letrozole on 6/3/2024 and completed adjuvant radiation therapy on 8/12/2024.  She presents today for follow-up.      Interval History  Unfortunately, she has had many issues related to adjuvant letrozole.    She endorses multiple episodes of hot flashes, and usually at nighttime, that are interrupting her sleep.  This is at times associated with night sweats.     She also endorses diffuse arthralgias, most notably in her right hip and right knee.  She also has associated back ache.  This is also most bothersome at nighttime or or after periods of being sedentary.    She has also had diffuse myalgias, although most notably in her right leg and right arm.  She feels that this is similar to when she had steroid associated myalgias.     She also reported daily dull headache, which have not been associated with any nausea, vomiting, or neurological symptoms.    She overall feels more fatigued and is needing to take naps on most days.            Her full oncologic history is as follows:   Oncologic History  Patient Active Problem List    Diagnosis Date Noted     Malignant neoplasm of upper-outer quadrant of left breast in female, estrogen receptor positive (H) 05/01/2024     Priority: High     Left breast cancer  3/20/2023 last normal screening mammo    4/15/2024  screening mammo showing focal asymmetry in the outer left breast at 3:00, 12-14 cm FN    4/23/2024 left breast ultrasound showing a 1.7 x 1.6 x 1.5 cm mass at 2:00, 11 cm FN, corresponding to the mammographic abnormality.  US guided biopsy demonstrates a grade 2 IDC.  ER (3+,> 95%), WA (1+, 30%), HER2 2+ negative by FISH, Ki-67 22%    5/6/2024 Contrast enhanced mammo  left breast demonstrates 1.8 cm of enhancement. No other enhancements     5/6/2024 US of left axillary LN normal     5/15/2024 left partial mastectomy and SLNB showing 1.6 cm of grade 2 IDC.  No LVI.  Negative margins.  ALH.  pT1cN0.  Biomarkers not repeated.  Oncotype Dx 10    6/3/2024 Start Letrozole     6/13/2024 [facility] germline testing - negative     7/15/2024 - 8/12/2024 Adjuvant RT 5256 cGy in 20 fractions       Coronary artery disease involving native coronary artery of native heart without angina pectoris 05/06/2024     Priority: Medium     GERD (gastroesophageal reflux disease) 05/06/2024     Priority: Medium     Mixed hyperlipidemia 05/06/2024     Priority: Medium     Primary hypertension 05/06/2024     Priority: Medium     Vitamin D deficiency 05/06/2024     Priority: Medium     Recurrent UTI 09/20/2023     Priority: Medium     Stage 3a chronic kidney disease (H) 03/17/2022     Priority: Medium     Other microscopic colitis 11/23/2021     Priority: Medium     Bilateral primary osteoarthritis of knee 04/28/2021     Priority: Medium     Age-related osteoporosis without current pathological fracture 04/20/2021     Priority: Medium     Mild intermittent asthma 07/09/2019     Priority: Medium            Current Medications:  Current Outpatient Medications   Medication Sig Dispense Refill     acetaminophen (TYLENOL) 650 MG CR tablet Take 1,300 mg by mouth 2 times daily       ASPIRIN LOW DOSE 81 MG chewable tablet CHEW AND SWALLOW 1 TAB IN THE MORNING AND 1 TAB IN THE EVENING FOR BLOOD CLOT PREVENTION.       Calcium Polycarbophil (FIBER) 625 MG  "tablet Take 1,250 mg by mouth daily.       cetirizine (ZYRTEC) 10 MG tablet Take 10 mg by mouth daily.       clindamycin (CLEOCIN) 150 MG capsule        diltiazem ER (CARDIZEM SR) 60 MG 12 hr capsule Take 60 mg by mouth 2 times daily.       letrozole (FEMARA) 2.5 MG tablet Take 1 tablet (2.5 mg) by mouth daily 30 tablet 11     Multiple Vitamin (MULTI-DAY VITAMINS PO) Take 1 tablet by mouth daily       nitroGLYcerin (NITROSTAT) 0.4 MG sublingual tablet Place 0.4 mg under the tongue.       pantoprazole (PROTONIX) 40 MG EC tablet TAKE ONE TABLET BY MOUTH EVERY MORNING 30 MINUTES BEFORE BREAKFAST       REPATHA SURECLICK 140 MG/ML prefilled autoinjector Inject 140 mg Subcutaneous       triamcinolone (KENALOG) 0.1 % external cream        vitamin D3 (CHOLECALCIFEROL) 125 MCG (5000 UT) tablet Take 5,000 Units by mouth daily.           ECOG Performance Status: 0    Physical Examination:  /85 (BP Location: Right arm, Patient Position: Sitting, Cuff Size: Adult Regular)   Pulse 81   Temp 97.6  F (36.4  C) (Oral)   Resp 16   Wt 79.5 kg (175 lb 4.8 oz)   SpO2 98%   BMI 34.24 kg/m    General:  Well appearing, well-nourished adult female in NAD.  HEENT:  Normocephalic.  Sclera anicteric.  MMM.  No lesions of the oropharynx.  Breast: Status post left partial mastectomy and SLNB. Well healed.   Lymph:  No cervical, supraclavicular, or axillary LAD.  Chest:  CTA bilaterally.  No wheezes or crackles.  CV:  RRR.  No murmurs or gallops  Abd:  Soft/NT/ND.  BS normoactive.  No hepatosplenomegaly.  Ext:  No pitting edema of the bilateral lower extremities.  Pulses 2+ and symmetric.  Musculo:  Strength 5/5 throughout.  Neuro:  Cranial nerves grossly intact.  Psych:  Mood and affect appear normal.    Laboratory Data:  No results found for: \"WBC\", \"HGB\", \"HCT\", \"MCV\", \"PLT\"  No results found for: \"NA\", \"HCO3\", \"CREATININE\", \"BUN\", \"ANIONGAP\", \"GLUCOSE\"  No results found for: \"ALT\", \"AST\", \"GGT\", \"ALKPHOS\", \"BILITOT\"  No results " "found for: \"INR\", \"PT\"      Radiology data:  I have personally reviewed the images of / or the following radiology data: As detailed in the oncology timeline      Pathology and other data:  I have personally reviewed the following data: As detailed in the oncology timeline      Assessment and Recommendations  Roxana Card  is a 73 year old postmenopausal  woman with a left breast pT1cN0 HR+/HER2 negative IDC of the left breast.    # Left breast cancer  - No adjuvant chemotherapy in light of low Oncotype score  - Recommend adjuvant endocrine therapy - in light of multiple adverse effects associated with letrozole, we discussed following approach:   - Hold letrozole for the next 2 weeks and reinitiate again.  If symptoms recur within 2 weeks, would discontinue letrozole going forward and trial exemestane  - Annual mammo - Dx mammo due 2/2025    # Bone health  - Continue calcium at least 1000 mg per day  - Continue with vitamin D3 - at least 1000u daily  - Recommend weightbearing exercises  - Dexa from 7/8/2024 showing osteopenia. Patient reports taking reclast annually for 3 years    # Extensive family history of Colon Cancer   - Has established care with genetic counselors - additional testing negative   - We discussed importance of staying up to date with cancer screening         RTC in 3 months       30 minutes spent on the date of the encounter doing chart review, review of test results, interpretation of tests, patient visit, and documentation       Dame Carolyne MD   of Medicine  Division of Hematology, Oncology and Transplantation  [facility]                 Again, thank you for allowing me to participate in the care of your patient.        Sincerely,        Dame Caroylne MD  "

## 2024-09-09 NOTE — NURSING NOTE
Oncology Rooming Note    September 9, 2024 10:32 AM   Roxana Card is a 73 year old female who presents for:    Chief Complaint   Patient presents with    Oncology Clinic Visit     Malignant Neoplasm of Left Breast     Initial Vitals: /85 (BP Location: Right arm, Patient Position: Sitting, Cuff Size: Adult Regular)   Pulse 81   Temp 97.6  F (36.4  C) (Oral)   Resp 16   Wt 79.5 kg (175 lb 4.8 oz)   SpO2 98%   BMI 34.24 kg/m   Estimated body mass index is 34.24 kg/m  as calculated from the following:    Height as of 6/13/24: 1.524 m (5').    Weight as of this encounter: 79.5 kg (175 lb 4.8 oz). Body surface area is 1.83 meters squared.  Mild Pain (2) Comment: Data Unavailable   No LMP recorded. Patient has had a hysterectomy.  Allergies reviewed: Yes  Medications reviewed: Yes    Medications: Medication refills not needed today.  Pharmacy name entered into [employer]: [id number] [place] [address]    Frailty Screening:   Is the patient here for a new oncology consult visit in cancer care? 2. No      Clinical concerns: None       Francesca Azevedo LPN  9/9/2024

## 2024-09-09 NOTE — PATIENT INSTRUCTIONS
Lets take a treatment break off the letrozole for 2 weeks and then restart again. Please let me know by 10/3 or 10/4 how you do on it. If symptoms reoccur, we will switch to exemestane

## 2024-10-18 NOTE — LETTER
9/18/24 ECC- Neg for dysplasia. Plan cotest in 1 year due by 9/18/25   2024      Roxana Card  [address] [facility] [address]      Dear Colleague,    Thank you for referring your patient, Roxana Card, to the [facility] [place]. Please see a copy of my visit note below.       Department of Radiation Oncology  [facility]  [facility]  [address] [address] [place] [id number]  [phone]       Consultation Note    Name: Roxana Card MRN: [id number]   : 1950   Date of Service: 2024   Referring: Dr. Fairbanks/ Dr. Aguilar     Reason for consultation: LEFT Stage IA breast cancer s/p breast conservation surgery    History of Present Illness     Ms. Card is a 73 year old female stage IA LEFT ER positive/MT positive/HER2 negative invasive ductal carcinoma status post breast conservation surgery (, 5/15/2024) with pathology demonstrating 16 mm invasive ductal carcinoma, grade 2, single focus, no LVSI, widely negative margins (closest margin was 4 mm anteriorly, remainder were 5 mm margins), 0/1 sentinel lymph node, pT1c N0.  Her Oncotype score was 10, and she would not be receiving any chemotherapy but endocrine therapy (Dr. Aguilar).    Briefly, her oncologic issues as follows:    Patient initially underwent a screening mammogram on 4/15/2024, which demonstrated a focal asymmetry in the left outer breast, 3 o'clock position, approximately 12 to 14 cm from the nipple.    She underwent a diagnostic mammogram and ultrasound on 2024 which informed an abnormality in the left breast, and demonstrated an irregular hypoechoic mass measuring up to 17 mm in size, the 2 o'clock position, 11 cm from the nipple.    She underwent biopsy on 2024 with pathology demonstrating invasive ductal carcinoma, grade 2, ER positive/MT positive/HER2 negative.    She underwent ultrasound of the axilla on 2024, which demonstrated benign-appearing lymph nodes.    She  "underwent breast conservation surgery on 5/15/2024 under care of . Pathology demonstrating 16 mm invasive ductal carcinoma, grade 2, single focus, no LVSI, widely negative margins (closest margin was 4 mm anteriorly, remainder were 5 mm margins), 0/1 sentinel lymph node, pT1c N0.      Her Oncotype score was 10, and she would not be receiving any chemotherapy but endocrine therapy (Dr. Aguilar).    Today, she presents for consideration of adjuvant radiotherapy to the left breast.Today, patient denies any breast tenderness, pain, drainage, fevers, chills, extremity range of motion difficulties, chest pain, dyspnea, or weight loss.     Past Medical History:   Past Medical History:   Diagnosis Date     Age-related osteoporosis without current pathological fracture 04/20/2021     Breast cancer (H) 04/23/2024    Left Breast     CAD (coronary artery disease)      GERD (gastroesophageal reflux disease) 05/06/2024     Microscopic colitis     resolved     Mild intermittent asthma 07/09/2019     Osteoarthritis        Past Surgical History:   Past Surgical History:   Procedure Laterality Date     BREAST BIOPSY, CORE RT/LT Left 04/23/2024     CV PCI  05/2016     HYSTERECTOMY, MEHRDAD  2007    for prolapse     LUMPECTOMY BREAST WITH SENTINEL NODE, COMBINED Left 05/15/2024    Procedure: LEFT Radiofrequency Identification Seed-Localized Segmental Mastectomy (=\"Lumpectomy\"), LEFT Axillary [place] Lymph Node Biopsy;  Surgeon: America Fairbanks MD;  Location: [facility] OR     RECTAL FISSURE SURGERY       RELEASE TRIGGER FINGER      x6 per patient     REPLACEMENT TOTAL KNEE Bilateral 10/23/2023     TONSILLECTOMY         Chemotherapy History:  No prior chemotherapy    Radiation History:  No prior radiation    Pregnant: No  Implanted Cardiac Devices: No    Medications:  Current Outpatient Medications   Medication Sig Dispense Refill     acetaminophen (TYLENOL) 650 MG CR tablet Take 1,300 mg by mouth 2 times daily       ASPIRIN LOW DOSE " 81 MG chewable tablet CHEW AND SWALLOW 1 TAB IN THE MORNING AND 1 TAB IN THE EVENING FOR BLOOD CLOT PREVENTION.       Calcium Polycarbophil (FIBER) 625 MG tablet Take 1,250 mg by mouth       cetirizine (ZYRTEC) 10 MG tablet Take 10 mg by mouth       clindamycin (CLEOCIN) 150 MG capsule        diltiazem ER (CARDIZEM SR) 60 MG 12 hr capsule Take 60 mg by mouth       letrozole (FEMARA) 2.5 MG tablet Take 1 tablet (2.5 mg) by mouth daily 30 tablet 11     Multiple Vitamin (MULTI-DAY VITAMINS PO) Take 1 tablet by mouth daily       pantoprazole (PROTONIX) 40 MG EC tablet TAKE ONE TABLET BY MOUTH EVERY MORNING 30 MINUTES BEFORE BREAKFAST       REPATHA SURECLICK 140 MG/ML prefilled autoinjector Inject 140 mg Subcutaneous       triamcinolone (KENALOG) 0.1 % external cream        vitamin D3 (CHOLECALCIFEROL) 125 MCG (5000 UT) tablet Take 5,000 Units by mouth       albuterol (PROAIR HFA/PROVENTIL HFA/VENTOLIN HFA) 108 (90 Base) MCG/ACT inhaler Inhale 2 puffs into the lungs (Patient not taking: Reported on 6/13/2024)       azelastine (ASTELIN) 0.1 % nasal spray Spray 2 sprays in nostril (Patient not taking: Reported on 6/13/2024)       nitroGLYcerin (NITROSTAT) 0.4 MG sublingual tablet Place 0.4 mg under the tongue (Patient not taking: Reported on 6/13/2024)       No current facility-administered medications for this visit.     Facility-Administered Medications Ordered in Other Visits   Medication Dose Route Frequency Provider Last Rate Last Admin     lidocaine 1 % 9 mL  9 mL Intradermal Once Jill Loyola MD             Allergies:     Allergies   Allergen Reactions     Penicillins Hives and Swelling     Sulfamethoxazole-Trimethoprim Hives and Swelling     Oxycodone Itching     Lisinopril Cough     ITCHING, TINGLING     Atorvastatin Other (See Comments)     Joint pain     Tramadol Nausea and Vomiting and Other (See Comments)     Dizziness       Social History:    Social History     Tobacco Use     Smoking status: Never      Smokeless tobacco: Never   Substance Use Topics     Alcohol use: Not Currently     Drug use: Never       Family History:  Family History   Problem Relation Age of Onset     Colon Cancer Father      Colon Cancer Paternal Aunt      Colon Cancer Paternal Uncle         x5 paternal uncles     Lung Cancer Paternal Uncle      Colon Cancer Cousin         Paternal male 1st Cousin x5     Breast Cancer Cousin         Maternal 1st Female Cousin and that cousin's daughter       Review of Systems   A 10-point review of systems was performed. Pertinent findings are noted in the HPI.    Physical Exam   ECOG Status: 1    Vitals:  BP (!) 143/83 (BP Location: Right arm, Patient Position: Chair, Cuff Size: Adult Regular)   Pulse 72   Temp 97.9  F (36.6  C) (Oral)   Resp 18   Ht 1.524 m (5')   Wt 79.4 kg (175 lb)   SpO2 98%   BMI 34.18 kg/m      Gen: Alert, in NAD  Head: NC/AT  Eyes: PERRL, EOMI, sclera anicteric  Ears: No external auricular lesions  Nose/sinus: No rhinorrhea or epistaxis  Breast:  no upper extremity range of motion limitation     Oral cavity/oropharynx: MMM, no visible oral cavity lesions  Neck: Full ROM, supple, no palpable adenopathy  Pulm: No wheezing, stridor or respiratory distress  CV: Extremities are warm and well-perfused, no cyanosis, no pedal edema  Abdominal: Normal bowel sounds, soft, nontender, no masses  Musculoskeletal: Normal bulk and tone  Skin: Normal color and turgor  Neuro: A/Ox3, CN II-XII intact, normal gait    Imaging/Path/Labs   Imaging: per HPI, personally reviewed and in agreement     Path: per HPI, personally reviewed and in agreement     Labs: per HPI, personally reviewed and in agreement     I have personally reviewed notes from Dr. Fairbanks, Dr. Aguilar    Assessment      Ms. Card is a 73 year old female stage IA LEFT ER positive/DE positive/HER2 negative invasive ductal carcinoma status post breast conservation surgery (, 5/15/2024) with pathology demonstrating 16 mm invasive  ductal carcinoma, grade 2, single focus, no LVSI, widely negative margins (closest margin was 4 mm anteriorly, remainder were 5 mm margins), 0/1 sentinel lymph node, pT1c N0.  Her Oncotype score was 10, and she would not be receiving any chemotherapy but endocrine therapy (Dr. Aguilar).    We discussed the role of adjuvant whole breast radiotherapy after breast conservation surgery per NCCN guidelines. In patients with negative axillary lymph nodes, WBRT +/- boost is standard of care.  In general, the benefits for whole breast radiation were reviewed and explained that adjuvant whole breast radiation following wide local excision decreases the risk of local recurrence by two-thirds to three-fourths and improves overall survival (Rush et al., NSABP B-06, NE, 2002; EBCTCG Metanalysis, Lancet 2011).      The options of conventional fractionation versus hypofractionation were reviewed.  However, we recommend hypofractionated radiation therapy based on results from the [place] hypofractionation trial (Dom et al, NEJ, 2010) and the update of the [place] START trial (John et al, Lancet Oncol, 2013), which both show equivalent local control, survival and cosmesis with these shorter treatment schedules as compared to conventional radiation fractionation.    Rather, patient did except express possible concern from traveling a long distance, she lives [place] 1.5 to 2 hours away, we also discussed the possibility of a short course of radiation, per the (FAST Forward Trial, Lancet, 2020).  We discussed that this is an option, and overall local control and side effect profile appear similar to a 3-week regimen, but that there is only 5-year data available.    We also discussed the role of RT omission, given that this benefit is reduced for older patients. In the CALGB 9343 (Eloy, SIL 2013) trial, elibiity criteria for this trial was patients over the age of 70, T1 tumors  (up to 2cm in size), estrogen receptor positive,  node negative, and with negative margins. Patient were randomized after breast conservation to tamoxifen alone versus tamoxifen plus radiation. The locoregional recurrence was 2% (with RT) versus 10% (without RT) at 10 years, but there was no difference in overall survival.    Additional time was spent specifically discussing the additional risk of cardiovascular disease due to radiation therapy. We reviewed retrospective data attributing a relative risk increase of 7.4% per Gy of mean heart dose (Jenae et al., NEJ, 2013). We discussed the meaning of relative risk in the setting of any other risk factors for cardiovascular disease, irrespective of the cancer diagnosis and/or treatment. Finally, we reviewed treatment techniques utilized in modern radiation therapy to minimize radiation dose to the heart, including deep inspiratory breath hold (DIBH).     Further, we discussed the logistics of treatment planning and delivery in detail with the patient. We also discussed side effects, including short term risks of fatigue and skin reaction, and long term risks of pneumonitis, lung fibrosis, soft tissue fibrosis, skin changes, breast contour changes, rib fractures, cardiac damage, secondary cancers, lymphedema, and thyroid dysfunction. We described the use of 3D-conformal radiotherapy to minimize dose to the normal tissues, while adequately covering the target tissues, and the ability of this technique to decrease potential for toxicity.     Plan     1. After extensive discussion, patient wishes to proceed to think about her options, particularly involving radiation (3 weeks versus fast for regimen) versus RT omission.I also advised for potential second opinion closer to home, which patient will attempt to arrange on her own. Patient will call us within the next 1 week or so, with her final decision..    2. Patient has already met with medical oncology (Dr. Aguilar) and has a oncotype of 10 and will not receive  chemotherapy. Patient will receive endocrine therapy.    All benefits and risks discussed, and patient is in agreement with the oncologic plan discussed above.     Thank you for allowing me to participate in your patient's care.  If you should require any additional information, please do not hesitate in contacting me.        Mikie Orellana MD  Department of Radiation Oncology  [facility]     A total of 90 minutes were spent on this visit, including preparation for this visit, face to face with patient, and medical decision making. Medical decision-making included consideration and possible diagnosis, management options, complex record review, review of diagnostic tests, consideration and discussion of significant complications based up medical history/comorbidities, and discussion with providers involved in care of the patient.       Again, thank you for allowing me to participate in the care of your patient.        Sincerely,        Mikie Orellana MD

## 2025-05-18 ENCOUNTER — HEALTH MAINTENANCE LETTER (OUTPATIENT)
Age: 75
End: 2025-05-18

## 2025-07-20 ENCOUNTER — HEALTH MAINTENANCE LETTER (OUTPATIENT)
Age: 75
End: 2025-07-20

## (undated) DEVICE — ESU HOLSTER PLASTIC DISP E2400

## (undated) DEVICE — ESU ELEC BLADE 2.75" COATED/INSULATED E1455

## (undated) DEVICE — MARKER SKIN DOUBLE TIP W/FLEXI-RULER W/LABELS

## (undated) DEVICE — CLIP ETHICON LIGACLIP SM BLUE LT100

## (undated) DEVICE — PREP CHLORAPREP 26ML TINTED ORANGE  260815

## (undated) DEVICE — COVER TRANSDUCER PROBE 610-637

## (undated) DEVICE — SU VICRYL 3-0 SH 27" UND J416H

## (undated) DEVICE — DRSG STERI STRIP 1/2X4" R1547

## (undated) DEVICE — BLADE KNIFE SURG 10 371110

## (undated) DEVICE — DRSG PRIMAPORE 03 1/8X6" 66000318

## (undated) DEVICE — SOL WATER IRRIG 1000ML BOTTLE 07139-09

## (undated) DEVICE — DRAPE IOBAN INCISE 23X17" 6650EZ

## (undated) DEVICE — GLOVE BIOGEL PI MICRO INDICATOR UNDERGLOVE SZ 6.0 48960

## (undated) DEVICE — SUCTION CANISTER MEDIVAC LINER 1500ML W/LID 65651-515

## (undated) DEVICE — GLOVE BIOGEL PI ULTRATOUCH G SZ 6.0 42160

## (undated) DEVICE — DRAPE U SPLIT 74X120" 29440

## (undated) DEVICE — PACK MINOR SBA15MIFSE

## (undated) DEVICE — SYR BULB IRRIG DOVER 60 ML LATEX FREE 67000

## (undated) DEVICE — SU MONOCRYL 4-0 P-3 18" UND Y494G

## (undated) DEVICE — NDL 30GA 1" 305128

## (undated) DEVICE — MARKER MARGIN MARKER STD 6 COLOR SGL USE MMS6

## (undated) DEVICE — ESU PENCIL W/SMOKE EVAC E2515HS

## (undated) DEVICE — SET BREAST BIOPSY LOCALIZER 20 PROBE 8MM PENCIL 09-0006

## (undated) RX ORDER — PROPOFOL 10 MG/ML
INJECTION, EMULSION INTRAVENOUS
Status: DISPENSED
Start: 2024-05-15

## (undated) RX ORDER — EPHEDRINE SULFATE 50 MG/ML
INJECTION, SOLUTION INTRAMUSCULAR; INTRAVENOUS; SUBCUTANEOUS
Status: DISPENSED
Start: 2024-05-15

## (undated) RX ORDER — ISOSULFAN BLUE 50 MG/5ML
INJECTION, SOLUTION SUBCUTANEOUS
Status: DISPENSED
Start: 2024-05-15

## (undated) RX ORDER — ACETAMINOPHEN 325 MG/1
TABLET ORAL
Status: DISPENSED
Start: 2024-05-15

## (undated) RX ORDER — FENTANYL CITRATE 50 UG/ML
INJECTION, SOLUTION INTRAMUSCULAR; INTRAVENOUS
Status: DISPENSED
Start: 2024-05-15

## (undated) RX ORDER — CEFAZOLIN SODIUM 2 G/50ML
SOLUTION INTRAVENOUS
Status: DISPENSED
Start: 2024-05-15

## (undated) RX ORDER — ONDANSETRON 2 MG/ML
INJECTION INTRAMUSCULAR; INTRAVENOUS
Status: DISPENSED
Start: 2024-05-15

## (undated) RX ORDER — BUPIVACAINE HYDROCHLORIDE 2.5 MG/ML
INJECTION, SOLUTION EPIDURAL; INFILTRATION; INTRACAUDAL
Status: DISPENSED
Start: 2024-05-15

## (undated) RX ORDER — DEXAMETHASONE SODIUM PHOSPHATE 4 MG/ML
INJECTION, SOLUTION INTRA-ARTICULAR; INTRALESIONAL; INTRAMUSCULAR; INTRAVENOUS; SOFT TISSUE
Status: DISPENSED
Start: 2024-05-15

## (undated) RX ORDER — FENTANYL CITRATE 0.05 MG/ML
INJECTION, SOLUTION INTRAMUSCULAR; INTRAVENOUS
Status: DISPENSED
Start: 2024-05-15